# Patient Record
Sex: FEMALE | Race: WHITE | NOT HISPANIC OR LATINO
[De-identification: names, ages, dates, MRNs, and addresses within clinical notes are randomized per-mention and may not be internally consistent; named-entity substitution may affect disease eponyms.]

---

## 2017-02-26 ENCOUNTER — RESULT REVIEW (OUTPATIENT)
Age: 52
End: 2017-02-26

## 2017-02-27 ENCOUNTER — APPOINTMENT (OUTPATIENT)
Dept: OBGYN | Facility: CLINIC | Age: 52
End: 2017-02-27

## 2018-03-01 ENCOUNTER — RESULT REVIEW (OUTPATIENT)
Age: 53
End: 2018-03-01

## 2018-03-02 ENCOUNTER — APPOINTMENT (OUTPATIENT)
Dept: OBGYN | Facility: CLINIC | Age: 53
End: 2018-03-02
Payer: COMMERCIAL

## 2018-03-02 PROCEDURE — 99396 PREV VISIT EST AGE 40-64: CPT

## 2018-03-02 PROCEDURE — 82272 OCCULT BLD FECES 1-3 TESTS: CPT

## 2018-07-10 ENCOUNTER — APPOINTMENT (OUTPATIENT)
Dept: ORTHOPEDIC SURGERY | Facility: CLINIC | Age: 53
End: 2018-07-10
Payer: COMMERCIAL

## 2018-07-10 VITALS — SYSTOLIC BLOOD PRESSURE: 104 MMHG | HEART RATE: 85 BPM | DIASTOLIC BLOOD PRESSURE: 71 MMHG

## 2018-07-10 VITALS — WEIGHT: 132 LBS | HEIGHT: 63 IN | BODY MASS INDEX: 23.39 KG/M2

## 2018-07-10 DIAGNOSIS — M19.039 PRIMARY OSTEOARTHRITIS, UNSPECIFIED WRIST: ICD-10-CM

## 2018-07-10 DIAGNOSIS — Z86.79 PERSONAL HISTORY OF OTHER DISEASES OF THE CIRCULATORY SYSTEM: ICD-10-CM

## 2018-07-10 DIAGNOSIS — M17.10 UNILATERAL PRIMARY OSTEOARTHRITIS, UNSPECIFIED KNEE: ICD-10-CM

## 2018-07-10 DIAGNOSIS — Z78.9 OTHER SPECIFIED HEALTH STATUS: ICD-10-CM

## 2018-07-10 DIAGNOSIS — Z82.62 FAMILY HISTORY OF OSTEOPOROSIS: ICD-10-CM

## 2018-07-10 DIAGNOSIS — M18.12 UNILATERAL PRIMARY OSTEOARTHRITIS OF FIRST CARPOMETACARPAL JOINT, LEFT HAND: ICD-10-CM

## 2018-07-10 DIAGNOSIS — M65.311 TRIGGER THUMB, RIGHT THUMB: ICD-10-CM

## 2018-07-10 DIAGNOSIS — Z87.09 PERSONAL HISTORY OF OTHER DISEASES OF THE RESPIRATORY SYSTEM: ICD-10-CM

## 2018-07-10 PROCEDURE — 20550 NJX 1 TENDON SHEATH/LIGAMENT: CPT | Mod: RT

## 2018-07-10 PROCEDURE — 99203 OFFICE O/P NEW LOW 30 MIN: CPT | Mod: 25

## 2018-07-10 RX ORDER — POTASSIUM CHLORIDE 20 MEQ/15ML
20 MEQ/15ML SOLUTION ORAL
Qty: 1350 | Refills: 0 | Status: ACTIVE | COMMUNITY
Start: 2017-09-20

## 2018-07-10 RX ORDER — OSELTAMIVIR PHOSPHATE 75 MG/1
75 CAPSULE ORAL
Qty: 10 | Refills: 0 | Status: ACTIVE | COMMUNITY
Start: 2018-01-23

## 2018-07-10 RX ORDER — ESCITALOPRAM OXALATE 10 MG/1
10 TABLET ORAL
Qty: 90 | Refills: 0 | Status: ACTIVE | COMMUNITY
Start: 2018-03-19

## 2018-07-10 RX ORDER — CHLORTHALIDONE 25 MG/1
25 TABLET ORAL
Qty: 30 | Refills: 0 | Status: ACTIVE | COMMUNITY
Start: 2018-03-19

## 2018-07-10 RX ORDER — ALENDRONATE SODIUM 70 MG/1
70 TABLET ORAL
Qty: 12 | Refills: 0 | Status: ACTIVE | COMMUNITY
Start: 2017-11-20

## 2018-07-10 RX ORDER — OMEPRAZOLE 20 MG/1
20 CAPSULE, DELAYED RELEASE ORAL
Qty: 7 | Refills: 0 | Status: ACTIVE | COMMUNITY
Start: 2018-01-18

## 2018-07-10 RX ORDER — IRBESARTAN 300 MG/1
300 TABLET ORAL
Qty: 90 | Refills: 0 | Status: ACTIVE | COMMUNITY
Start: 2017-09-20

## 2018-07-10 RX ORDER — NAPROXEN 500 MG/1
500 TABLET ORAL
Qty: 14 | Refills: 0 | Status: ACTIVE | COMMUNITY
Start: 2018-01-18

## 2019-03-04 ENCOUNTER — APPOINTMENT (OUTPATIENT)
Dept: OBGYN | Facility: CLINIC | Age: 54
End: 2019-03-04
Payer: COMMERCIAL

## 2019-03-04 ENCOUNTER — RESULT REVIEW (OUTPATIENT)
Age: 54
End: 2019-03-04

## 2019-03-04 PROCEDURE — 99396 PREV VISIT EST AGE 40-64: CPT

## 2019-07-09 ENCOUNTER — FORM ENCOUNTER (OUTPATIENT)
Age: 54
End: 2019-07-09

## 2020-02-27 ENCOUNTER — TRANSCRIPTION ENCOUNTER (OUTPATIENT)
Age: 55
End: 2020-02-27

## 2020-03-26 ENCOUNTER — APPOINTMENT (OUTPATIENT)
Dept: OBGYN | Facility: CLINIC | Age: 55
End: 2020-03-26

## 2020-06-02 ENCOUNTER — RESULT REVIEW (OUTPATIENT)
Age: 55
End: 2020-06-02

## 2020-06-02 ENCOUNTER — APPOINTMENT (OUTPATIENT)
Dept: ULTRASOUND IMAGING | Facility: CLINIC | Age: 55
End: 2020-06-02
Payer: COMMERCIAL

## 2020-06-02 ENCOUNTER — APPOINTMENT (OUTPATIENT)
Dept: MAMMOGRAPHY | Facility: CLINIC | Age: 55
End: 2020-06-02
Payer: COMMERCIAL

## 2020-06-02 ENCOUNTER — OUTPATIENT (OUTPATIENT)
Dept: OUTPATIENT SERVICES | Facility: HOSPITAL | Age: 55
LOS: 1 days | End: 2020-06-02
Payer: COMMERCIAL

## 2020-06-02 DIAGNOSIS — Z00.8 ENCOUNTER FOR OTHER GENERAL EXAMINATION: ICD-10-CM

## 2020-06-02 PROCEDURE — 77063 BREAST TOMOSYNTHESIS BI: CPT | Mod: 26

## 2020-06-02 PROCEDURE — 77067 SCR MAMMO BI INCL CAD: CPT | Mod: 26

## 2020-06-02 PROCEDURE — 76830 TRANSVAGINAL US NON-OB: CPT | Mod: 26

## 2020-06-02 PROCEDURE — 76641 ULTRASOUND BREAST COMPLETE: CPT | Mod: 26,50

## 2020-06-02 PROCEDURE — 76856 US EXAM PELVIC COMPLETE: CPT

## 2020-06-02 PROCEDURE — 76830 TRANSVAGINAL US NON-OB: CPT

## 2020-06-02 PROCEDURE — 77063 BREAST TOMOSYNTHESIS BI: CPT

## 2020-06-02 PROCEDURE — 77067 SCR MAMMO BI INCL CAD: CPT

## 2020-06-02 PROCEDURE — 76856 US EXAM PELVIC COMPLETE: CPT | Mod: 26

## 2020-06-02 PROCEDURE — 76641 ULTRASOUND BREAST COMPLETE: CPT

## 2020-06-15 ENCOUNTER — FORM ENCOUNTER (OUTPATIENT)
Age: 55
End: 2020-06-15

## 2020-12-07 ENCOUNTER — OUTPATIENT (OUTPATIENT)
Dept: OUTPATIENT SERVICES | Facility: HOSPITAL | Age: 55
LOS: 1 days | End: 2020-12-07
Payer: COMMERCIAL

## 2020-12-07 ENCOUNTER — RESULT REVIEW (OUTPATIENT)
Age: 55
End: 2020-12-07

## 2020-12-07 ENCOUNTER — APPOINTMENT (OUTPATIENT)
Dept: ULTRASOUND IMAGING | Facility: CLINIC | Age: 55
End: 2020-12-07
Payer: COMMERCIAL

## 2020-12-07 DIAGNOSIS — Z00.00 ENCOUNTER FOR GENERAL ADULT MEDICAL EXAMINATION WITHOUT ABNORMAL FINDINGS: ICD-10-CM

## 2020-12-07 PROCEDURE — 76856 US EXAM PELVIC COMPLETE: CPT | Mod: 26

## 2020-12-07 PROCEDURE — 76830 TRANSVAGINAL US NON-OB: CPT | Mod: 26

## 2020-12-07 PROCEDURE — 76830 TRANSVAGINAL US NON-OB: CPT

## 2020-12-07 PROCEDURE — 76856 US EXAM PELVIC COMPLETE: CPT

## 2020-12-08 ENCOUNTER — FORM ENCOUNTER (OUTPATIENT)
Age: 55
End: 2020-12-08

## 2021-04-11 ENCOUNTER — FORM ENCOUNTER (OUTPATIENT)
Age: 56
End: 2021-04-11

## 2021-04-12 ENCOUNTER — APPOINTMENT (OUTPATIENT)
Dept: OBGYN | Facility: CLINIC | Age: 56
End: 2021-04-12
Payer: COMMERCIAL

## 2021-04-12 ENCOUNTER — RESULT REVIEW (OUTPATIENT)
Age: 56
End: 2021-04-12

## 2021-04-12 PROCEDURE — 99072 ADDL SUPL MATRL&STAF TM PHE: CPT

## 2021-04-12 PROCEDURE — 99396 PREV VISIT EST AGE 40-64: CPT

## 2021-04-15 ENCOUNTER — FORM ENCOUNTER (OUTPATIENT)
Age: 56
End: 2021-04-15

## 2021-04-25 ENCOUNTER — FORM ENCOUNTER (OUTPATIENT)
Age: 56
End: 2021-04-25

## 2021-06-03 ENCOUNTER — RESULT REVIEW (OUTPATIENT)
Age: 56
End: 2021-06-03

## 2021-06-03 ENCOUNTER — APPOINTMENT (OUTPATIENT)
Dept: MAMMOGRAPHY | Facility: CLINIC | Age: 56
End: 2021-06-03
Payer: COMMERCIAL

## 2021-06-03 ENCOUNTER — APPOINTMENT (OUTPATIENT)
Dept: ULTRASOUND IMAGING | Facility: CLINIC | Age: 56
End: 2021-06-03
Payer: COMMERCIAL

## 2021-06-03 ENCOUNTER — OUTPATIENT (OUTPATIENT)
Dept: OUTPATIENT SERVICES | Facility: HOSPITAL | Age: 56
LOS: 1 days | End: 2021-06-03
Payer: COMMERCIAL

## 2021-06-03 DIAGNOSIS — Z00.8 ENCOUNTER FOR OTHER GENERAL EXAMINATION: ICD-10-CM

## 2021-06-03 PROCEDURE — 76641 ULTRASOUND BREAST COMPLETE: CPT | Mod: 26,50

## 2021-06-03 PROCEDURE — 77067 SCR MAMMO BI INCL CAD: CPT

## 2021-06-03 PROCEDURE — 76856 US EXAM PELVIC COMPLETE: CPT | Mod: 26

## 2021-06-03 PROCEDURE — 76830 TRANSVAGINAL US NON-OB: CPT

## 2021-06-03 PROCEDURE — 76830 TRANSVAGINAL US NON-OB: CPT | Mod: 26

## 2021-06-03 PROCEDURE — 77063 BREAST TOMOSYNTHESIS BI: CPT

## 2021-06-03 PROCEDURE — 77063 BREAST TOMOSYNTHESIS BI: CPT | Mod: 26

## 2021-06-03 PROCEDURE — 77067 SCR MAMMO BI INCL CAD: CPT | Mod: 26

## 2021-06-03 PROCEDURE — 76856 US EXAM PELVIC COMPLETE: CPT

## 2021-06-03 PROCEDURE — 76641 ULTRASOUND BREAST COMPLETE: CPT

## 2021-06-06 ENCOUNTER — FORM ENCOUNTER (OUTPATIENT)
Age: 56
End: 2021-06-06

## 2021-06-10 ENCOUNTER — FORM ENCOUNTER (OUTPATIENT)
Age: 56
End: 2021-06-10

## 2021-09-23 ENCOUNTER — LABORATORY RESULT (OUTPATIENT)
Age: 56
End: 2021-09-23

## 2021-09-23 ENCOUNTER — APPOINTMENT (OUTPATIENT)
Dept: DERMATOLOGY | Facility: CLINIC | Age: 56
End: 2021-09-23
Payer: COMMERCIAL

## 2021-09-23 PROCEDURE — 11102 TANGNTL BX SKIN SINGLE LES: CPT | Mod: 59

## 2021-09-23 PROCEDURE — 17110 DESTRUCTION B9 LES UP TO 14: CPT

## 2021-09-23 PROCEDURE — 99203 OFFICE O/P NEW LOW 30 MIN: CPT | Mod: 25

## 2021-10-01 ENCOUNTER — NON-APPOINTMENT (OUTPATIENT)
Age: 56
End: 2021-10-01

## 2021-10-18 ENCOUNTER — APPOINTMENT (OUTPATIENT)
Dept: DERMATOLOGY | Facility: CLINIC | Age: 56
End: 2021-10-18
Payer: COMMERCIAL

## 2021-10-18 ENCOUNTER — APPOINTMENT (OUTPATIENT)
Dept: DERMATOLOGY | Facility: CLINIC | Age: 56
End: 2021-10-18

## 2021-10-18 DIAGNOSIS — L50.3 DERMATOGRAPHIC URTICARIA: ICD-10-CM

## 2021-10-18 DIAGNOSIS — L85.3 XEROSIS CUTIS: ICD-10-CM

## 2021-10-18 DIAGNOSIS — L98.8 OTHER SPECIFIED DISORDERS OF THE SKIN AND SUBCUTANEOUS TISSUE: ICD-10-CM

## 2021-10-18 DIAGNOSIS — D22.9 MELANOCYTIC NEVI, UNSPECIFIED: ICD-10-CM

## 2021-10-18 PROCEDURE — 99213 OFFICE O/P EST LOW 20 MIN: CPT

## 2022-05-10 ENCOUNTER — APPOINTMENT (OUTPATIENT)
Dept: ORTHOPEDIC SURGERY | Facility: CLINIC | Age: 57
End: 2022-05-10
Payer: COMMERCIAL

## 2022-05-10 VITALS — WEIGHT: 132 LBS | BODY MASS INDEX: 23.39 KG/M2 | HEIGHT: 63 IN

## 2022-05-10 DIAGNOSIS — M17.11 UNILATERAL PRIMARY OSTEOARTHRITIS, RIGHT KNEE: ICD-10-CM

## 2022-05-10 DIAGNOSIS — S80.01XA CONTUSION OF RIGHT KNEE, INITIAL ENCOUNTER: ICD-10-CM

## 2022-05-10 PROCEDURE — 73564 X-RAY EXAM KNEE 4 OR MORE: CPT | Mod: RT

## 2022-05-10 PROCEDURE — 99204 OFFICE O/P NEW MOD 45 MIN: CPT

## 2022-05-10 PROCEDURE — 99203 OFFICE O/P NEW LOW 30 MIN: CPT

## 2022-05-10 NOTE — HISTORY OF PRESENT ILLNESS
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 fell directly on knee.  pain located anterior aspect of right knee with associated swelling, abrasion at patella.  worse with standing and flexion of right knee, better at rest.  has tried icing and Aleve with some improvement.\par

## 2022-05-10 NOTE — IMAGING

## 2022-05-10 NOTE — ASSESSMENT
[FreeTextEntry1] : Right  X-Ray Examination of the KNEE (4 views): medial and patellofemoral degenerate changes.bone spure superio patella\par \par right knee contusion and oa exab\par \par - We discussed their diagnosis and treatment options at length including surgical and non-surgical options.\par - We will continue conservative treatment with activity modification, PT, icing, weight loss, and anti-inflammatory medications.\par - The patient was provided with a PT prescription to work on ROM, hip ER/abductors strengthening, quad/hamstring stretches and strengthening, and other exercises.\par - The patient was advised to let pain guide the gradual advancement of activities.\par - We discussed the possible of injections (steroid and viscosupplementation) in the future.\par - Follow up as needed in 6 weeks as to re-evaluate.\par \par \par Medication Discussion:\par 1) We discussed a comprehensive treatment plan that included possible pharmaceutical management involving the use of prescription strength medications including but not limited to options such as oral Naprosyn 500mg BID, once daily Meloxicam 15 mg, or 500-650 mg Tylenol versus over the counter oral medications in addition to discussing possible topical prescription Pennsaid vs  Voltaren gel.\par 2) There is a moderate risk of morbidity with further treatment, especially from use of prescription strength medications and possible side effects of these medications which include but are not limited to upset stomach with oral medications, skin reactions to topical medications and GI/cardiac/renal issues with long term use.\par 3) I recommended that the patient follow-up with their medical physician to discuss any significant specific potential issues with long term medication use such as interactions with current medications or with exacerbation of underlying medical comorbidities.\par 4) The benefits and risks associated with use of oral and / or topical prescription and over the counter anti-inflammatory medications were discussed with the patient. The patient voiced understanding of the risks including but not limited to bleeding, stroke, kidney dysfunction, heart disease, and were referred to the black box warning label for further information.\par \par

## 2022-05-11 DIAGNOSIS — Z87.19 PERSONAL HISTORY OF OTHER DISEASES OF THE DIGESTIVE SYSTEM: ICD-10-CM

## 2022-05-11 DIAGNOSIS — Z87.39 PERSONAL HISTORY OF OTHER DISEASES OF THE MUSCULOSKELETAL SYSTEM AND CONNECTIVE TISSUE: ICD-10-CM

## 2022-05-11 DIAGNOSIS — E78.00 PURE HYPERCHOLESTEROLEMIA, UNSPECIFIED: ICD-10-CM

## 2022-05-11 DIAGNOSIS — Z87.42 PERSONAL HISTORY OF OTHER DISEASES OF THE FEMALE GENITAL TRACT: ICD-10-CM

## 2022-05-11 DIAGNOSIS — R37 SEXUAL DYSFUNCTION, UNSPECIFIED: ICD-10-CM

## 2022-05-11 DIAGNOSIS — Z78.0 ASYMPTOMATIC MENOPAUSAL STATE: ICD-10-CM

## 2022-05-11 RX ORDER — ATORVASTATIN CALCIUM 80 MG/1
TABLET, FILM COATED ORAL
Refills: 0 | Status: ACTIVE | COMMUNITY

## 2022-05-13 ENCOUNTER — NON-APPOINTMENT (OUTPATIENT)
Age: 57
End: 2022-05-13

## 2022-05-13 DIAGNOSIS — Z92.89 PERSONAL HISTORY OF OTHER MEDICAL TREATMENT: ICD-10-CM

## 2022-05-13 DIAGNOSIS — Z98.890 OTHER SPECIFIED POSTPROCEDURAL STATES: ICD-10-CM

## 2022-05-13 DIAGNOSIS — Z87.19 PERSONAL HISTORY OF OTHER DISEASES OF THE DIGESTIVE SYSTEM: ICD-10-CM

## 2022-05-13 DIAGNOSIS — Z78.9 OTHER SPECIFIED HEALTH STATUS: ICD-10-CM

## 2022-06-03 ENCOUNTER — APPOINTMENT (OUTPATIENT)
Dept: OBGYN | Facility: CLINIC | Age: 57
End: 2022-06-03
Payer: COMMERCIAL

## 2022-06-03 VITALS
SYSTOLIC BLOOD PRESSURE: 117 MMHG | WEIGHT: 132 LBS | DIASTOLIC BLOOD PRESSURE: 79 MMHG | BODY MASS INDEX: 23.39 KG/M2 | HEIGHT: 63 IN

## 2022-06-03 DIAGNOSIS — N95.2 POSTMENOPAUSAL ATROPHIC VAGINITIS: ICD-10-CM

## 2022-06-03 DIAGNOSIS — Z12.4 ENCOUNTER FOR SCREENING FOR MALIGNANT NEOPLASM OF CERVIX: ICD-10-CM

## 2022-06-03 DIAGNOSIS — N95.1 MENOPAUSAL AND FEMALE CLIMACTERIC STATES: ICD-10-CM

## 2022-06-03 DIAGNOSIS — Z12.39 ENCOUNTER FOR OTHER SCREENING FOR MALIGNANT NEOPLASM OF BREAST: ICD-10-CM

## 2022-06-03 DIAGNOSIS — Z90.721 ACQUIRED ABSENCE OF OVARIES, UNILATERAL: ICD-10-CM

## 2022-06-03 DIAGNOSIS — R92.2 INCONCLUSIVE MAMMOGRAM: ICD-10-CM

## 2022-06-03 PROCEDURE — 99212 OFFICE O/P EST SF 10 MIN: CPT | Mod: 25

## 2022-06-03 PROCEDURE — 99396 PREV VISIT EST AGE 40-64: CPT

## 2022-06-03 RX ORDER — HYDROCHLOROTHIAZIDE 25 MG/1
25 TABLET ORAL
Qty: 90 | Refills: 0 | Status: DISCONTINUED | COMMUNITY
Start: 2017-11-20 | End: 2022-06-03

## 2022-06-03 RX ORDER — DOXAZOSIN 1 MG/1
1 TABLET ORAL
Qty: 90 | Refills: 0 | Status: DISCONTINUED | COMMUNITY
Start: 2017-09-20 | End: 2022-06-03

## 2022-06-03 NOTE — COUNSELING
[Nutrition/ Exercise/ Weight Management] : nutrition, exercise, weight management [Vitamins/Supplements] : vitamins/supplements [Breast Self Exam] : breast self exam [STD (testing, results, tx)] : STD (testing, results, tx) [Vaccines] : vaccines [Lab Results] : lab results [Confidentiality] : confidentiality [Medication Management] : medication management [Other ___] : [unfilled]

## 2022-06-03 NOTE — END OF VISIT
[FreeTextEntry3] : I, Lillie Neumann, acted as a scribe on behalf for Brunilda Fields NP on 06/03/2022.\par \par All medical entries made by the scribe were at my, Brunilda Fields, direction and personally dictated by me on 06/03/2022. I have reviewed the chart and agree that the record accurately reflects my personal performance of the history, physical exam, assessment, and plan. I have personally directed, reviewed, and agreed with the chart.

## 2022-06-03 NOTE — PHYSICAL EXAM
[Appropriately responsive] : appropriately responsive [Alert] : alert [No Acute Distress] : no acute distress [No Lymphadenopathy] : no lymphadenopathy [Soft] : soft [Non-tender] : non-tender [Non-distended] : non-distended [No HSM] : No HSM [No Lesions] : no lesions [No Mass] : no mass [Oriented x3] : oriented x3 [Examination Of The Breasts] : a normal appearance [No Masses] : no breast masses were palpable [Labia Majora] : normal [Labia Minora] : normal [Normal] : normal [Uterine Adnexae] : normal [Chaperone Declined] : Patient declined chaperone [Vulvar Atrophy] : vulvar atrophy

## 2022-06-03 NOTE — HISTORY OF PRESENT ILLNESS
[Patient reported PAP Smear was normal] : Patient reported PAP Smear was normal [postmenopausal] : postmenopausal [N] : Patient does not use contraception [Y] : Positive pregnancy history [Mammogramdate] : 6/2021 [TextBox_19] : BIRADS 2 [BreastSonogramDate] : 6/2021 [TextBox_25] : BIRADS 2 [PapSmeardate] : 4/2021 [TextBox_31] : NILM/HPV NEG [BoneDensityDate] : 2020 [TextBox_37] : osteoporosis on Fosmax [[facility]] : 2 [Hot Flashes] : hot flashes [Difficulty with [place]] : difficulty with intercourse [Options Discussed] : options discussed [Herbal Options] : herbal options [Vaginal Lubrication] : vaginal lubrication [SSRI] : SSRI [TextBox_18] : taking lexapro 5mg for vasomotor symptoms; advised hyaluronic acid for vaginal dryness and discomfort [Experiencing Menopausal Sxs] : experiencing menopausal symptoms [Yes] : Patient has concerns regarding sex [Currently Active] : currently active [Men] : men [No] : No [Patient refuses STI testing] : Patient refuses STI testing [FreeTextEntry1] : vaginal dryness

## 2022-06-03 NOTE — PLAN
[FreeTextEntry1] : 56 y/o P2 post-menopausal female, annual exam\par \par health care maintenance\par -pap / HPV done today\par -vit D/exercise/BMD\par -rx breast mammo/sono given\par -reviewed monthly BSE\par -colon screening reviewed\par -f/u PCP for annual and appropriate immunizations\par -rto 1 year, earlier if needed\par \par Vaginal Dryness / vasomotor symptoms\par -continue Lexapro 5 mg daily\par -hyaluronic acid vaginal suppository 2-3 times per week \par -coconut oil for lubrication during intercourse\par \par Osteoporosis \par -recent DEXA scan, managed by endocrinologist\par -pt taking Fosamax, considering Prolia cardio clearance\par \par Hx ovarian cysts / hx oophorectomy\par -rx pelvic sono\par \par RTO 1 yr or sooner as needed

## 2022-06-03 NOTE — REVIEW OF SYSTEMS
[Negative] : Heme/Lymph [Patient Intake Form Reviewed] : Patient intake form was reviewed [FreeTextEntry8] : vaginal irritation

## 2022-06-06 LAB — HPV HIGH+LOW RISK DNA PNL CVX: NOT DETECTED

## 2022-06-08 LAB — CYTOLOGY CVX/VAG DOC THIN PREP: ABNORMAL

## 2022-07-25 ENCOUNTER — APPOINTMENT (OUTPATIENT)
Dept: ULTRASOUND IMAGING | Facility: CLINIC | Age: 57
End: 2022-07-25

## 2022-07-25 ENCOUNTER — RESULT REVIEW (OUTPATIENT)
Age: 57
End: 2022-07-25

## 2022-07-25 ENCOUNTER — OUTPATIENT (OUTPATIENT)
Dept: OUTPATIENT SERVICES | Facility: HOSPITAL | Age: 57
LOS: 1 days | End: 2022-07-25
Payer: COMMERCIAL

## 2022-07-25 ENCOUNTER — APPOINTMENT (OUTPATIENT)
Dept: MAMMOGRAPHY | Facility: CLINIC | Age: 57
End: 2022-07-25

## 2022-07-25 DIAGNOSIS — Z12.39 ENCOUNTER FOR OTHER SCREENING FOR MALIGNANT NEOPLASM OF BREAST: ICD-10-CM

## 2022-07-25 DIAGNOSIS — R92.2 INCONCLUSIVE MAMMOGRAM: ICD-10-CM

## 2022-07-25 DIAGNOSIS — Z90.721 ACQUIRED ABSENCE OF OVARIES, UNILATERAL: ICD-10-CM

## 2022-07-25 DIAGNOSIS — Z00.8 ENCOUNTER FOR OTHER GENERAL EXAMINATION: ICD-10-CM

## 2022-07-25 PROCEDURE — 76830 TRANSVAGINAL US NON-OB: CPT | Mod: 26

## 2022-07-25 PROCEDURE — 76641 ULTRASOUND BREAST COMPLETE: CPT | Mod: 26,50

## 2022-07-25 PROCEDURE — 77067 SCR MAMMO BI INCL CAD: CPT | Mod: 26

## 2022-07-25 PROCEDURE — 77063 BREAST TOMOSYNTHESIS BI: CPT | Mod: 26

## 2022-07-25 PROCEDURE — 76856 US EXAM PELVIC COMPLETE: CPT

## 2022-07-25 PROCEDURE — 76856 US EXAM PELVIC COMPLETE: CPT | Mod: 26

## 2022-07-25 PROCEDURE — 77067 SCR MAMMO BI INCL CAD: CPT

## 2022-07-25 PROCEDURE — 77063 BREAST TOMOSYNTHESIS BI: CPT

## 2022-07-25 PROCEDURE — 76830 TRANSVAGINAL US NON-OB: CPT

## 2022-07-25 PROCEDURE — 76641 ULTRASOUND BREAST COMPLETE: CPT

## 2022-10-10 ENCOUNTER — APPOINTMENT (OUTPATIENT)
Dept: ORTHOPEDIC SURGERY | Facility: CLINIC | Age: 57
End: 2022-10-10

## 2022-10-10 VITALS — WEIGHT: 132 LBS | HEIGHT: 63 IN | BODY MASS INDEX: 23.39 KG/M2

## 2022-10-10 DIAGNOSIS — S16.1XXA STRAIN OF MUSCLE, FASCIA AND TENDON AT NECK LEVEL, INITIAL ENCOUNTER: ICD-10-CM

## 2022-10-10 DIAGNOSIS — M54.12 RADICULOPATHY, CERVICAL REGION: ICD-10-CM

## 2022-10-10 PROCEDURE — 99203 OFFICE O/P NEW LOW 30 MIN: CPT

## 2022-10-10 PROCEDURE — 72050 X-RAY EXAM NECK SPINE 4/5VWS: CPT

## 2022-10-10 PROCEDURE — 72110 X-RAY EXAM L-2 SPINE 4/>VWS: CPT

## 2022-10-10 RX ORDER — NAPROXEN 500 MG/1
500 TABLET ORAL
Qty: 60 | Refills: 1 | Status: ACTIVE | COMMUNITY
Start: 2022-10-10 | End: 1900-01-01

## 2022-10-10 RX ORDER — CYCLOBENZAPRINE HYDROCHLORIDE 5 MG/1
5 TABLET, FILM COATED ORAL 3 TIMES DAILY
Qty: 60 | Refills: 0 | Status: ACTIVE | COMMUNITY
Start: 2022-10-10 | End: 1900-01-01

## 2022-10-10 NOTE — IMAGING
[Disc space narrowing] : Disc space narrowing [No bony abnormalities] : No bony abnormalities [FreeTextEntry1] : 4/5

## 2022-10-10 NOTE — ASSESSMENT
[FreeTextEntry1] : Due to worsening RUE symptoms will get mri cervical spine for further evaluation of hnp/Stenosis. \par Discussed further treatment options including MDP pending mri results. \par In the interim would recommend a course of physical therapy for her neck and back. \par Also recommend naprosyn and flexeril as needed. \par She will follow up after the mri.

## 2022-10-10 NOTE — HISTORY OF PRESENT ILLNESS
[Lower back] : lower back [Sudden] : sudden [8] : 8 [3] : 3 [Dull/Aching] : dull/aching [Localized] : localized [Constant] : constant [Meds] : meds [Heat] : heat [Standing] : standing [de-identified] : 58 y/o F here for further evaluation of lower back pain. She c/o intermittent left leg burning for the past few weeks whichg did resolve. Then recently 4 days ago she stepped out of the car the wrong way and started having sharp pain to her lower back. No Bowel or  bladder issues. She has been taking aleve that gives her partial relief. She denies any previous lower back issues. She also c/o right thumb tingling getting progressively worse over the past 6 months.  She also states she has chronic neck pain that has gotten better with physical therapy in the past. \par \par Occupation: Speech Pathologist\par Allergies: PCN, sulfur, erythromycin. \par  [] : no [FreeTextEntry3] : 1 WEEK  [FreeTextEntry5] : PT STATED SHE WAS WORKING OUT WHEN SHE FELT A POP IN HER LOWE BACK HAS HAD CONSTANT BACK PAIN SINCE

## 2022-12-10 ENCOUNTER — APPOINTMENT (OUTPATIENT)
Dept: ORTHOPEDIC SURGERY | Facility: CLINIC | Age: 57
End: 2022-12-10
Payer: COMMERCIAL

## 2022-12-10 VITALS — WEIGHT: 132 LBS | HEIGHT: 63 IN | BODY MASS INDEX: 23.39 KG/M2

## 2022-12-10 DIAGNOSIS — S29.012A STRAIN OF MUSCLE AND TENDON OF BACK WALL OF THORAX, INITIAL ENCOUNTER: ICD-10-CM

## 2022-12-10 PROCEDURE — 99204 OFFICE O/P NEW MOD 45 MIN: CPT

## 2022-12-10 PROCEDURE — 99213 OFFICE O/P EST LOW 20 MIN: CPT

## 2022-12-10 RX ORDER — GABAPENTIN 100 MG/1
100 CAPSULE ORAL 3 TIMES DAILY
Qty: 30 | Refills: 0 | Status: ACTIVE | COMMUNITY
Start: 2022-12-10 | End: 1900-01-01

## 2022-12-10 NOTE — HISTORY OF PRESENT ILLNESS
[9] : 9 [3] : 3 [Dull/Aching] : dull/aching [Sharp] : sharp [Shooting] : shooting [Constant] : constant [Rest] : rest [de-identified] : 54 y/o F with atraumatic thoracic back pain x 1-2 weeks. Occasional tingling BUE. Denies numbness. denies bladder or bowel issues. She went to [facility] and was given MDP and muscle relaxers without relief.\par denies DM.\par  [] : no

## 2022-12-10 NOTE — ASSESSMENT
[FreeTextEntry1] : A/P Thoracic back pain with radicular symptoms\par - cont medrol dose rufus\par - muscle relaxant\par - MRI\par - f/u spine\par

## 2022-12-10 NOTE — IMAGING
[de-identified] : PE thoracic spine: +tenderness to R paraspinals, mild midline tenderness, limited motion due to pain, able to SLR with pain, motor and sensory intact, NVI\par  [No bony abnormalities] : No bony abnormalities

## 2022-12-13 ENCOUNTER — APPOINTMENT (OUTPATIENT)
Dept: MRI IMAGING | Facility: CLINIC | Age: 57
End: 2022-12-13

## 2022-12-21 ENCOUNTER — APPOINTMENT (OUTPATIENT)
Dept: PAIN MANAGEMENT | Facility: CLINIC | Age: 57
End: 2022-12-21
Payer: COMMERCIAL

## 2022-12-21 PROCEDURE — 99204 OFFICE O/P NEW MOD 45 MIN: CPT

## 2022-12-21 PROCEDURE — 99203 OFFICE O/P NEW LOW 30 MIN: CPT

## 2022-12-21 RX ORDER — TRAMADOL HYDROCHLORIDE 50 MG/1
50 TABLET, COATED ORAL 3 TIMES DAILY
Qty: 21 | Refills: 0 | Status: ACTIVE | COMMUNITY
Start: 2022-12-21 | End: 1900-01-01

## 2022-12-21 RX ORDER — GABAPENTIN 300 MG/1
300 CAPSULE ORAL
Qty: 90 | Refills: 0 | Status: ACTIVE | COMMUNITY
Start: 2022-12-21 | End: 1900-01-01

## 2022-12-21 NOTE — HISTORY OF PRESENT ILLNESS
[Upper back] : upper back [Mid-back] : mid-back [10] : 10 [9] : 9 [Constant] : constant [Sleep] : sleep [Rest] : rest [Meds] : meds [Heat] : heat [Sitting] : sitting [Standing] : standing [Walking] : walking [Bending forward] : bending forward [Stairs] : stairs [FreeTextEntry1] : 12/21/22- Pain is in thoracic area for last 2 weeks.  She had seen Dr. Feliz for low back pain and got better.  Then had MDP and did not help.  Went to urgent care and then had thoracic MRI.  T11-12 central disc extrusion mildly effaces ventral thecal sac without significant spinal canal stenosis.  Has muscle spasms.  Pain radiates around to ribs on left side.  She is taking Naprosyn which helped.  \par  [] : no [FreeTextEntry6] : spam, numbness on the foot  [FreeTextEntry7] : left thigh and right foot  [de-identified] : MRI thoracic spine 12/14/22

## 2022-12-21 NOTE — ASSESSMENT
[FreeTextEntry1] : After discussing various treatment options with the patient including but not limited to oral medications, physical therapy, exercise, modalities as well as interventional spinal injections, we have decided with the following plan:\par \par 1) Intervention Injection Therapy:\par I personally reviewed the MRI/CT scan images and agree with the radiologist's report. The radiological findings were discussed with the patient.\par The risks, benefits, contents and alternatives to injection were explained in full to the patient. Risks outlined include but are not limited to infection,sepsis, bleeding, post-dural puncture headache, nerve damage, temporary increase in pain, syncopal episode, failure to resolve symptoms, allergic reaction, symptom recurrence, and elevation of blood sugar in diabetics. Cortisone may cause immunosuppression. Patient understands the risks. All questions were answered. After discussion of options, patient requested an injection. Information regarding the injection was given to the patient. Which medications to stop prior to the injection was explained to the patient as well.\par \par Follow up in 1-2 weeks post injection for re-evaluation. \par Continue Home exercises, stretching, activity modification, physical therapy, and conservative care.\par Patient is presenting with acute/sub-acute radicular pain with impairment in ADLs and functionality.  The pain has not responded to  conservative care including nsaid therapy and/or physical therapy.  There is no bleeding tendency, unstable medical condition, or systemic infection.\par \par THESI T11/12

## 2022-12-22 ENCOUNTER — APPOINTMENT (OUTPATIENT)
Dept: PAIN MANAGEMENT | Facility: CLINIC | Age: 57
End: 2022-12-22

## 2022-12-22 ENCOUNTER — APPOINTMENT (OUTPATIENT)
Dept: ORTHOPEDIC SURGERY | Facility: CLINIC | Age: 57
End: 2022-12-22

## 2022-12-28 LAB — SARS-COV-2 N GENE NPH QL NAA+PROBE: NOT DETECTED

## 2022-12-29 ENCOUNTER — APPOINTMENT (OUTPATIENT)
Age: 57
End: 2022-12-29
Payer: COMMERCIAL

## 2022-12-29 PROCEDURE — 62321 NJX INTERLAMINAR CRV/THRC: CPT

## 2023-01-11 ENCOUNTER — APPOINTMENT (OUTPATIENT)
Dept: PAIN MANAGEMENT | Facility: CLINIC | Age: 58
End: 2023-01-11
Payer: COMMERCIAL

## 2023-01-11 VITALS — BODY MASS INDEX: 23.39 KG/M2 | WEIGHT: 132 LBS | HEIGHT: 63 IN

## 2023-01-11 PROCEDURE — 99214 OFFICE O/P EST MOD 30 MIN: CPT

## 2023-01-11 RX ORDER — GABAPENTIN 300 MG/1
300 CAPSULE ORAL
Qty: 120 | Refills: 2 | Status: ACTIVE | COMMUNITY
Start: 2023-01-11 | End: 1900-01-01

## 2023-01-11 NOTE — ASSESSMENT
[FreeTextEntry1] : After discussing various treatment options with the patient including but not limited to oral medications, physical therapy, exercise, modalities as well as interventional spinal injections, we have decided with the following plan:\par \par 1) The patient would benefit from Trial \par of physical therapy. Short and Long Term goals would be improvement of pain level, improvement of range of motion, improvement of strength and overall improvement of quality of life. \par \par 2) A MRI is indicated as there has been failure of numerous conservative therapies over the last 6-8 weeks. these include but are not limited to medication therapy and physical therapy. \par \par 3) increase gabapentin. currently on 300mg TID

## 2023-01-11 NOTE — REASON FOR VISIT
[Initial Consultation] : an initial pain management consultation [FreeTextEntry2] : f/u to injection

## 2023-01-11 NOTE — HISTORY OF PRESENT ILLNESS
[Upper back] : upper back [Mid-back] : mid-back [Constant] : constant [Rest] : rest [Meds] : meds [Heat] : heat [Sitting] : sitting [Standing] : standing [Walking] : walking [Bending forward] : bending forward [Stairs] : stairs [2] : 2 [1] : 2 [Radiating] : radiating [Injection therapy] : injection therapy [] : no [FreeTextEntry6] : numbness on the foot  [FreeTextEntry7] : left thigh and right foot , left leg on the hip area  [de-identified] : MRI thoracic spine 12/14/22 [FreeTextEntry1] : 1/11/23- fu for THESI T11-12 on 12/29/22. Pt with about 20% relief of the upper back, however still with stiffness. Pain also in the left buttock, \par \par 12/21/22- Pain is in thoracic area for last 2 weeks.  She had seen Dr. Feliz for low back pain and got better.  Then had MDP and did not help.  Went to urgent care and then had thoracic MRI.  T11-12 central disc extrusion mildly effaces ventral thecal sac without significant spinal canal stenosis.  Has muscle spasms.  Pain radiates around to ribs on left side.  She is taking Naprosyn which helped.  \par

## 2023-01-16 ENCOUNTER — APPOINTMENT (OUTPATIENT)
Dept: MRI IMAGING | Facility: CLINIC | Age: 58
End: 2023-01-16

## 2023-01-25 ENCOUNTER — APPOINTMENT (OUTPATIENT)
Dept: ULTRASOUND IMAGING | Facility: CLINIC | Age: 58
End: 2023-01-25
Payer: COMMERCIAL

## 2023-01-25 PROCEDURE — 76700 US EXAM ABDOM COMPLETE: CPT

## 2023-01-30 ENCOUNTER — FORM ENCOUNTER (OUTPATIENT)
Age: 58
End: 2023-01-30

## 2023-01-31 ENCOUNTER — APPOINTMENT (OUTPATIENT)
Dept: MRI IMAGING | Facility: CLINIC | Age: 58
End: 2023-01-31
Payer: COMMERCIAL

## 2023-01-31 PROCEDURE — 72148 MRI LUMBAR SPINE W/O DYE: CPT

## 2023-02-06 ENCOUNTER — APPOINTMENT (OUTPATIENT)
Dept: PAIN MANAGEMENT | Facility: CLINIC | Age: 58
End: 2023-02-06
Payer: COMMERCIAL

## 2023-02-06 VITALS — WEIGHT: 136 LBS | HEIGHT: 63 IN | BODY MASS INDEX: 24.1 KG/M2

## 2023-02-06 DIAGNOSIS — M50.90 CERVICAL DISC DISORDER, UNSPECIFIED, UNSPECIFIED CERVICAL REGION: ICD-10-CM

## 2023-02-06 DIAGNOSIS — M53.3 SACROCOCCYGEAL DISORDERS, NOT ELSEWHERE CLASSIFIED: ICD-10-CM

## 2023-02-06 DIAGNOSIS — M51.24 OTHER INTERVERTEBRAL DISC DISPLACEMENT, THORACIC REGION: ICD-10-CM

## 2023-02-06 PROCEDURE — 99213 OFFICE O/P EST LOW 20 MIN: CPT

## 2023-02-06 PROCEDURE — 99214 OFFICE O/P EST MOD 30 MIN: CPT

## 2023-02-06 RX ORDER — GABAPENTIN 300 MG/1
300 CAPSULE ORAL 3 TIMES DAILY
Qty: 270 | Refills: 0 | Status: ACTIVE | COMMUNITY
Start: 2023-02-06 | End: 1900-01-01

## 2023-02-06 NOTE — HISTORY OF PRESENT ILLNESS
[Upper back] : upper back [Mid-back] : mid-back [2] : 2 [1] : 2 [Radiating] : radiating [Constant] : constant [Rest] : rest [Meds] : meds [Heat] : heat [Injection therapy] : injection therapy [Sitting] : sitting [Standing] : standing [Walking] : walking [Bending forward] : bending forward [Stairs] : stairs [] : no [FreeTextEntry6] : numbness on the foot  [FreeTextEntry7] : left thigh and right foot , left leg on the hip area  [de-identified] : MRI thoracic spine 12/14/22 [FreeTextEntry1] : 2/6/23- follow up to review MRI lumbar spine. \par MRI lumbar spine: (1/31/23)1. T12-L1: Broad bulge with central herniation and cranial migration posterior to T12 impressing on the thecal \par sac.\par 2. L1-L2: Facet hypertrophy and ligamentum flavum hypertrophy.\par 3. L2-L3: Facet hypertrophy and ligamentum flavum hypertrophy.\par 4. L3-L4: Facet hypertrophy and ligamentum flavum hypertrophy.\par 5. L4-L5: Facet hypertrophy and ligamentum flavum hypertrophy. 6. L5-S1: Facet hypertrophy and ligamentum flavum hypertrophy.\par 7. 10 mm nonspecific lesion at second sacral vertebral body. MRI of the sacrum with and without contrast\par suggested for better detailed evaluation. No fracture.\par \par Pain in the left thoracic spine that radiates around. \par \par 1/11/23- fu for THESI T11-12 on 12/29/22. Pt with about 20% relief of the upper back, however still with stiffness. Pain also in the left buttock, \par \par 12/21/22- Pain is in thoracic area for last 2 weeks.  She had seen Dr. Feliz for low back pain and got better.  Then had MDP and did not help.  Went to urgent care and then had thoracic MRI.  T11-12 central disc extrusion mildly effaces ventral thecal sac without significant spinal canal stenosis.  Has muscle spasms.  Pain radiates around to ribs on left side.  She is taking Naprosyn which helped.  \par

## 2023-02-06 NOTE — PHYSICAL EXAM
[4___] : right hip flexion 4[unfilled]/5 [] : motor exam is not 5/5 throughout both lower extremities with normal tone

## 2023-02-06 NOTE — ASSESSMENT
[FreeTextEntry1] : After discussing various treatment options with the patient including but not limited to oral medications, physical therapy, exercise, modalities as well as interventional spinal injections, we have decided with the following plan:\par \par 1) MRI sacrum with and without contrast to further evaluate findings seen on MRI lumbar spine. \par \par 2) The patient would benefit from continuation of physical therapy. Short and Long Term goals would be improvement of pain level, improvement of range of motion, improvement of strength and overall improvement of quality of life.\par \par Patient instructed to continue both active and passive therapy, at home as an extension of the treatment process in order to maintain improvement. \par \par Goals: improve cardiovascular fitness, reduce edema, improve muscle strength, improve connective tissue strength and integrity, increase bone density, promote circulation to enhance soft tissue healing, improvement of muscle recruitment, increased ROM and promotion of normal movement. \par \par 3) continue gabapentin 300mg TID

## 2023-02-12 ENCOUNTER — FORM ENCOUNTER (OUTPATIENT)
Age: 58
End: 2023-02-12

## 2023-02-13 ENCOUNTER — APPOINTMENT (OUTPATIENT)
Dept: MRI IMAGING | Facility: CLINIC | Age: 58
End: 2023-02-13
Payer: COMMERCIAL

## 2023-02-13 PROCEDURE — 72197 MRI PELVIS W/O & W/DYE: CPT

## 2023-02-15 ENCOUNTER — APPOINTMENT (OUTPATIENT)
Dept: PAIN MANAGEMENT | Facility: CLINIC | Age: 58
End: 2023-02-15
Payer: COMMERCIAL

## 2023-02-15 VITALS — HEIGHT: 63 IN | BODY MASS INDEX: 24.1 KG/M2 | WEIGHT: 136 LBS

## 2023-02-15 DIAGNOSIS — M54.16 RADICULOPATHY, LUMBAR REGION: ICD-10-CM

## 2023-02-15 PROCEDURE — 99214 OFFICE O/P EST MOD 30 MIN: CPT

## 2023-02-15 PROCEDURE — 99213 OFFICE O/P EST LOW 20 MIN: CPT

## 2023-02-15 NOTE — HISTORY OF PRESENT ILLNESS
[Upper back] : upper back [Mid-back] : mid-back [2] : 2 [1] : 2 [Radiating] : radiating [Constant] : constant [Rest] : rest [Meds] : meds [Heat] : heat [Injection therapy] : injection therapy [Sitting] : sitting [Standing] : standing [Walking] : walking [Bending forward] : bending forward [Stairs] : stairs [FreeTextEntry1] : 02/15/23: follow up today to review MRI Sacrum 2/13/22 Impression: \par 1.5 cm nonspecific lesion in the second sacral vertebral body with no significant enhancement. No surrounding \par marrow edema, no cortical disruption, or soft tissue lesion. Differential could include atypical hemangioma \par although this is completely nonspecific. Follow up imaging in six months' time suggested for further evaluation.\par this was reviewed with the patient. Pain in the left buttock. She just started seeing a new therapist. \par \par 2/6/23- follow up to review MRI lumbar spine. \par MRI lumbar spine: (1/31/23)1. T12-L1: Broad bulge with central herniation and cranial migration posterior to T12 impressing on the thecal \par sac.\par 2. L1-L2: Facet hypertrophy and ligamentum flavum hypertrophy.\par 3. L2-L3: Facet hypertrophy and ligamentum flavum hypertrophy.\par 4. L3-L4: Facet hypertrophy and ligamentum flavum hypertrophy.\par 5. L4-L5: Facet hypertrophy and ligamentum flavum hypertrophy. \par 6. L5-S1: Facet hypertrophy and ligamentum flavum hypertrophy.\par 7. 10 mm nonspecific lesion at second sacral vertebral body. MRI of the sacrum with and without contrast\par suggested for better detailed evaluation. No fracture.\par \par Pain in the left thoracic spine that radiates around. \par \par 1/11/23- fu for THESI T11-12 on 12/29/22. Pt with about 20% relief of the upper back, however still with stiffness. Pain also in the left buttock, \par \par 12/21/22- Pain is in thoracic area for last 2 weeks.  She had seen Dr. Feliz for low back pain and got better.  Then had MDP and did not help.  Went to urgent care and then had thoracic MRI.  T11-12 central disc extrusion mildly effaces ventral thecal sac without significant spinal canal stenosis.  Has muscle spasms.  Pain radiates around to ribs on left side.  She is taking Naprosyn which helped.  \par  [] : no [FreeTextEntry6] : numbness on the foot  [de-identified] : MRI thoracic spine 12/14/22 [FreeTextEntry7] : left thigh and right foot , left leg on the hip area

## 2023-02-15 NOTE — HISTORY OF PRESENT ILLNESS
[Upper back] : upper back [Mid-back] : mid-back [2] : 2 [1] : 2 [Radiating] : radiating [Constant] : constant [Rest] : rest [Meds] : meds [Heat] : heat [Injection therapy] : injection therapy [Sitting] : sitting [Standing] : standing [Walking] : walking [Bending forward] : bending forward [Stairs] : stairs [FreeTextEntry1] : 02/15/23: follow up today to review MRI Sacrum 2/13/22 Impression: \par 1.5 cm nonspecific lesion in the second sacral vertebral body with no significant enhancement. No surrounding \par marrow edema, no cortical disruption, or soft tissue lesion. Differential could include atypical hemangioma \par although this is completely nonspecific. Follow up imaging in six months' time suggested for further evaluation.\par this was reviewed with the patient. Pain in the left buttock. She just started seeing a new therapist. \par \par 2/6/23- follow up to review MRI lumbar spine. \par MRI lumbar spine: (1/31/23)1. T12-L1: Broad bulge with central herniation and cranial migration posterior to T12 impressing on the thecal \par sac.\par 2. L1-L2: Facet hypertrophy and ligamentum flavum hypertrophy.\par 3. L2-L3: Facet hypertrophy and ligamentum flavum hypertrophy.\par 4. L3-L4: Facet hypertrophy and ligamentum flavum hypertrophy.\par 5. L4-L5: Facet hypertrophy and ligamentum flavum hypertrophy. \par 6. L5-S1: Facet hypertrophy and ligamentum flavum hypertrophy.\par 7. 10 mm nonspecific lesion at second sacral vertebral body. MRI of the sacrum with and without contrast\par suggested for better detailed evaluation. No fracture.\par \par Pain in the left thoracic spine that radiates around. \par \par 1/11/23- fu for THESI T11-12 on 12/29/22. Pt with about 20% relief of the upper back, however still with stiffness. Pain also in the left buttock, \par \par 12/21/22- Pain is in thoracic area for last 2 weeks.  She had seen Dr. Feliz for low back pain and got better.  Then had MDP and did not help.  Went to urgent care and then had thoracic MRI.  T11-12 central disc extrusion mildly effaces ventral thecal sac without significant spinal canal stenosis.  Has muscle spasms.  Pain radiates around to ribs on left side.  She is taking Naprosyn which helped.  \par  [] : no [FreeTextEntry6] : numbness on the foot  [FreeTextEntry7] : left thigh and right foot , left leg on the hip area  [de-identified] : MRI thoracic spine 12/14/22

## 2023-02-15 NOTE — ASSESSMENT
[FreeTextEntry1] : After discussing various treatment options with the patient including but not limited to oral medications, physical therapy, exercise, modalities as well as interventional spinal injections, we have decided with the following plan: \par \par 1) The patient would benefit from continuation of physical therapy. Short and Long Term goals would be improvement of pain level, improvement of range of motion, improvement of strength and overall improvement of quality of life.\par \par Patient instructed to continue both active and passive therapy, at home as an extension of the treatment process in order to maintain improvement. \par \par Goals: improve cardiovascular fitness, reduce edema, improve muscle strength, improve connective tissue strength and integrity, increase bone density, promote circulation to enhance soft tissue healing, improvement of muscle recruitment, increased ROM and promotion of normal movement. \par \par 3) will titrate gabapentin down to 300mg BID to see if as effective. \par \par 4) if no better, or getting worse, consider either LESI L4/5 or left lumbar MBB\par The risks, benefits, contents and alternatives to injection were explained in full to the patient.  Risks outlined include but are not limited to infection,sepsis, bleeding, post-dural puncture headache, nerve damage, temporary increase in pain, syncopal episode, failure to resolve symptoms, allergic reaction, symptom recurrence, and elevation of blood sugar in diabetics. Cortisone may cause immunosuppression.  Patient understands the risks.  All questions were answered.  After discussion of options, patient requested an injection.  Information regarding the injection was given to the patient.  Which medications to stop prior to the injection was explained to the patient as well

## 2023-03-08 ENCOUNTER — APPOINTMENT (OUTPATIENT)
Dept: PAIN MANAGEMENT | Facility: CLINIC | Age: 58
End: 2023-03-08
Payer: COMMERCIAL

## 2023-03-08 VITALS — WEIGHT: 138 LBS | BODY MASS INDEX: 24.45 KG/M2 | HEIGHT: 63 IN

## 2023-03-08 DIAGNOSIS — S39.012A STRAIN OF MUSCLE, FASCIA AND TENDON OF LOWER BACK, INITIAL ENCOUNTER: ICD-10-CM

## 2023-03-08 PROCEDURE — 99213 OFFICE O/P EST LOW 20 MIN: CPT

## 2023-03-08 RX ORDER — CELECOXIB 200 MG/1
200 CAPSULE ORAL
Qty: 30 | Refills: 0 | Status: ACTIVE | COMMUNITY
Start: 2023-03-08 | End: 1900-01-01

## 2023-03-08 NOTE — HISTORY OF PRESENT ILLNESS
[Upper back] : upper back [Mid-back] : mid-back [Rest] : rest [Meds] : meds [Heat] : heat [Injection therapy] : injection therapy [Sitting] : sitting [Standing] : standing [Walking] : walking [Bending forward] : bending forward [Stairs] : stairs [Dull/Aching] : dull/aching [Physical therapy] : physical therapy [Radiating] : radiating [Sharp] : sharp [FreeTextEntry1] : 02/15/23: follow up today to review MRI Sacrum 2/13/22 Impression: \par 1.5 cm nonspecific lesion in the second sacral vertebral body with no significant enhancement. No surrounding \par marrow edema, no cortical disruption, or soft tissue lesion. Differential could include atypical hemangioma \par although this is completely nonspecific. Follow up imaging in six months' time suggested for further evaluation.\par this was reviewed with the patient. Pain in the left buttock. She just started seeing a new therapist. \par \par 2/6/23- follow up to review MRI lumbar spine. \par MRI lumbar spine: (1/31/23)1. T12-L1: Broad bulge with central herniation and cranial migration posterior to T12 impressing on the thecal \par sac.\par 2. L1-L2: Facet hypertrophy and ligamentum flavum hypertrophy.\par 3. L2-L3: Facet hypertrophy and ligamentum flavum hypertrophy.\par 4. L3-L4: Facet hypertrophy and ligamentum flavum hypertrophy.\par 5. L4-L5: Facet hypertrophy and ligamentum flavum hypertrophy. \par 6. L5-S1: Facet hypertrophy and ligamentum flavum hypertrophy.\par 7. 10 mm nonspecific lesion at second sacral vertebral body. MRI of the sacrum with and without contrast\par suggested for better detailed evaluation. No fracture.\par \par Pain in the left thoracic spine that radiates around. \par \par 1/11/23- fu for THESI T11-12 on 12/29/22. Pt with about 20% relief of the upper back, however still with stiffness. Pain also in the left buttock, \par \par 12/21/22- Pain is in thoracic area for last 2 weeks.  She had seen Dr. Feliz for low back pain and got better.  Then had MDP and did not help.  Went to urgent care and then had thoracic MRI.  T11-12 central disc extrusion mildly effaces ventral thecal sac without significant spinal canal stenosis.  Has muscle spasms.  Pain radiates around to ribs on left side.  She is taking Naprosyn which helped.  \par  [2] : 2 [1] : 2 [Constant] : constant [] : no [FreeTextEntry6] : numbness on the foot  [FreeTextEntry7] : left thigh and right foot , left leg on the hip area  [de-identified] : MRI thoracic spine 12/14/22

## 2023-03-08 NOTE — ASSESSMENT
[FreeTextEntry1] : After discussing various treatment options with the patient including but not limited to oral medications, physical therapy, exercise, modalities as well as interventional spinal injections, we have decided with the following plan: \par \par 1) The patient would benefit from continuation of physical therapy. Short and Long Term goals would be improvement of pain level, improvement of range of motion, improvement of strength and overall improvement of quality of life.\par \par Patient instructed to continue both active and passive therapy, at home as an extension of the treatment process in order to maintain improvement. \par \par Goals: improve cardiovascular fitness, reduce edema, improve muscle strength, improve connective tissue strength and integrity, increase bone density, promote circulation to enhance soft tissue healing, improvement of muscle recruitment, increased ROM and promotion of normal movement. \par \par 3) will titrate gabapentin down to 300mg BID to see if as effective. \par \par LESI L4-L5 will call\par \par The risks, benefits, contents and alternatives to injection were explained in full to the patient.  Risks outlined include but are not limited to infection,sepsis, bleeding, post-dural puncture headache, nerve damage, temporary increase in pain, syncopal episode, failure to resolve symptoms, allergic reaction, symptom recurrence, and elevation of blood sugar in diabetics. Cortisone may cause immunosuppression.  Patient understands the risks.  All questions were answered.  After discussion of options, patient requested an injection.  Information regarding the injection was given to the patient.  Which medications to stop prior to the injection was explained to the patient as well

## 2023-05-22 DIAGNOSIS — N83.209 UNSPECIFIED OVARIAN CYST, UNSPECIFIED SIDE: ICD-10-CM

## 2023-06-05 ENCOUNTER — APPOINTMENT (OUTPATIENT)
Dept: OBGYN | Facility: CLINIC | Age: 58
End: 2023-06-05
Payer: COMMERCIAL

## 2023-06-05 VITALS
HEIGHT: 63 IN | BODY MASS INDEX: 23.92 KG/M2 | WEIGHT: 135 LBS | SYSTOLIC BLOOD PRESSURE: 139 MMHG | DIASTOLIC BLOOD PRESSURE: 86 MMHG

## 2023-06-05 PROCEDURE — 99396 PREV VISIT EST AGE 40-64: CPT

## 2023-06-05 NOTE — PLAN
[FreeTextEntry1] : 57 yo female pt present for an annual wellness exam\par \par HCM\par -pap done today \par -rx given for breast mammo/ sono\par -rx given for pelvic sono\par -f/u PCP for annual and appropriate immunizations\par -rto 1 year

## 2023-06-05 NOTE — HISTORY OF PRESENT ILLNESS
[Patient reported mammogram was normal] : Patient reported mammogram was normal [Patient reported breast sonogram was normal] : Patient reported breast sonogram was normal [Patient reported PAP Smear was normal] : Patient reported PAP Smear was normal [FreeTextEntry1] : 57 y/o female pt  presents for an annual wellness visit. LMP: Age 46. The pt has some questions regarding Lume due to vaginal odor. The pt is well and has no complaints. \par \par OBHx:  x2 (c/b PTL, PEC)\par PMHx: asthma, htn, ibs, osteoporosis, HCL, breast fibroadenoma \par SHx: cholecystectomy, r oophorectomy\par Allergies: sulfa, pcn, azithromycin\par Medications: Lexapro 5 mg for hot flashes; Fosamax, chlorthalidone, potassium, atorvastatin \par  [Mammogramdate] : 07/22 [TextBox_19] : Birads 2 [BreastSonogramDate] : 07/22 [PapSmeardate] : 06/22

## 2023-06-07 LAB — HPV HIGH+LOW RISK DNA PNL CVX: NOT DETECTED

## 2023-06-08 LAB — CYTOLOGY CVX/VAG DOC THIN PREP: ABNORMAL

## 2023-07-27 ENCOUNTER — NON-APPOINTMENT (OUTPATIENT)
Age: 58
End: 2023-07-27

## 2023-07-27 ENCOUNTER — APPOINTMENT (OUTPATIENT)
Dept: PHYSICAL MEDICINE AND REHAB | Facility: CLINIC | Age: 58
End: 2023-07-27
Payer: COMMERCIAL

## 2023-07-27 DIAGNOSIS — S29.019A STRAIN OF MUSCLE AND TENDON OF UNSPECIFIED WALL OF THORAX, INITIAL ENCOUNTER: ICD-10-CM

## 2023-07-27 PROCEDURE — 20553 NJX 1/MLT TRIGGER POINTS 3/>: CPT

## 2023-07-27 PROCEDURE — 99204 OFFICE O/P NEW MOD 45 MIN: CPT | Mod: 25

## 2023-07-27 RX ORDER — CELECOXIB 200 MG/1
200 CAPSULE ORAL DAILY
Qty: 30 | Refills: 1 | Status: ACTIVE | COMMUNITY
Start: 2023-07-27 | End: 1900-01-01

## 2023-07-27 NOTE — PROCEDURE
[de-identified] : Patient with myofascial pain, resistant to treatment with pain medication, therapy. Risks, benefits, expectations, and complications of trigger point injections were discussed, informed consent was obtained, and patient agreed to trigger point injections. Palpation was used to identify taught muscle bands, A 25 gauge, 1.5 inch needle was used, 2% lidocaine, x 0.6 cc, to each trigger point in left thoracic paraspinal muscles x 2, lower trapezius, patient tolerated the procedure well, can use heat/take tylenol as needed for pain.\par

## 2023-07-27 NOTE — REVIEW OF SYSTEMS
[Muscle Pain] : muscle pain [Fever] : no fever [Chills] : no chills [Incontinence] : no incontinence [Muscle Weakness] : no muscle weakness [Difficulty Walking] : no difficulty walking

## 2023-07-27 NOTE — PHYSICAL EXAM
[FreeTextEntry1] : Gen: seated, hunched over in discomfort but NAD\par Head: atraumatic, normocephalic\par Eyes: anicteric\par Resp: normal effort on room air\par Back: spine nontender; focal area of tenderness and palpable muscle tightness left of midline to lower thoracic and lumbar back; b/l SLR neg\par Ext: no cyanosis or edema\par Skin: warm and dry\par Neuro: alert; oriented to self, place, situation; strength 5/5 throughout; sensation grossly intact to light touch; reflexes 2+ and symmetric; tandem gait intact\par Psych: appropriate mood and affect

## 2023-07-27 NOTE — HISTORY OF PRESENT ILLNESS
[FreeTextEntry1] : 57yo F w/ h/o HTN, PUD, and osteoporosis on Prolia, presenting for recurrent L midback and buttock pain. Pain first occurred 12/2022. Only some relief w/ Medrol Dosepak and T11-12 FERMIN at that time. Resolved w/ prolonged course of PT, nightly Flexeril, and scheduled naproxen. After 3wks scheduled naproxen, developed stomach ulcer. Pain resolved by 02/2023. Took course of Celebrex + Protonix for recurrent pain in 03/2022. Has continued w/ home exercises.\par \par Current episode began w/ waxing/waning L buttock pain w/ radiation to posterior thigh, attributed to increased walking. Then severe L midback pain developed 2d ago. No acute trauma. Some relief by leaning over. 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 Symptoms very similar to 12/2022 episode. No new numbness, tingling, weakness, incontinence. Resumed PT 07/20; received heat and massage. Some relief with heat at home. Flexeril helping with sleep, but less so for pain relief. Took leftover naproxen this AM. No relief w/ Tylenol or lidocaine patch. On vitD + Prolia for osteoporosis. Last Prolia injection w/i last 6mo. Endorses nutritious diet. No previous trial of trigger injections.

## 2023-07-27 NOTE — ASSESSMENT
[FreeTextEntry1] : 57yo F w/ h/o PUD and osteoporosis on Prolia, presenting for recurrent acute back pain. History and exam most suggestive of muscular pain. No trauma or midline tenderness concerning for fracture, and pt on schedule for Prolia. No signs/symptoms concerning for radicular/neuropathic etiology. 2% lidocaine trigger point injections provided to 3 sites of paraspinal muscles of L mid and lower back. Rx provided for Celebrex and Protonix. Rx provided to continue PT. Continue HEP and PRN heat. F/u PRN for repeat trigger injections or worsening/persistent pain.

## 2023-07-28 ENCOUNTER — RESULT REVIEW (OUTPATIENT)
Age: 58
End: 2023-07-28

## 2023-07-28 ENCOUNTER — APPOINTMENT (OUTPATIENT)
Dept: ULTRASOUND IMAGING | Facility: CLINIC | Age: 58
End: 2023-07-28
Payer: COMMERCIAL

## 2023-07-28 ENCOUNTER — OUTPATIENT (OUTPATIENT)
Dept: OUTPATIENT SERVICES | Facility: HOSPITAL | Age: 58
LOS: 1 days | End: 2023-07-28
Payer: COMMERCIAL

## 2023-07-28 ENCOUNTER — APPOINTMENT (OUTPATIENT)
Dept: MAMMOGRAPHY | Facility: CLINIC | Age: 58
End: 2023-07-28
Payer: COMMERCIAL

## 2023-07-28 DIAGNOSIS — Z12.39 ENCOUNTER FOR OTHER SCREENING FOR MALIGNANT NEOPLASM OF BREAST: ICD-10-CM

## 2023-07-28 PROCEDURE — 77067 SCR MAMMO BI INCL CAD: CPT

## 2023-07-28 PROCEDURE — 77063 BREAST TOMOSYNTHESIS BI: CPT | Mod: 26

## 2023-07-28 PROCEDURE — 76641 ULTRASOUND BREAST COMPLETE: CPT

## 2023-07-28 PROCEDURE — 76641 ULTRASOUND BREAST COMPLETE: CPT | Mod: 26,50

## 2023-07-28 PROCEDURE — 77067 SCR MAMMO BI INCL CAD: CPT | Mod: 26

## 2023-07-28 PROCEDURE — 76856 US EXAM PELVIC COMPLETE: CPT | Mod: 26

## 2023-07-28 PROCEDURE — 76830 TRANSVAGINAL US NON-OB: CPT | Mod: 26

## 2023-07-28 PROCEDURE — 77063 BREAST TOMOSYNTHESIS BI: CPT

## 2023-07-28 PROCEDURE — 76830 TRANSVAGINAL US NON-OB: CPT

## 2023-07-28 PROCEDURE — 76856 US EXAM PELVIC COMPLETE: CPT

## 2023-07-31 RX ORDER — PANTOPRAZOLE 40 MG/1
40 TABLET, DELAYED RELEASE ORAL
Qty: 90 | Refills: 0 | Status: ACTIVE | COMMUNITY
Start: 2023-07-31 | End: 1900-01-01

## 2023-07-31 RX ORDER — PANTOPRAZOLE 40 MG/1
40 TABLET, DELAYED RELEASE ORAL
Qty: 30 | Refills: 0 | Status: DISCONTINUED | COMMUNITY
Start: 2023-07-27 | End: 2023-07-31

## 2023-08-03 ENCOUNTER — APPOINTMENT (OUTPATIENT)
Dept: PHYSICAL MEDICINE AND REHAB | Facility: CLINIC | Age: 58
End: 2023-08-03

## 2023-08-06 ENCOUNTER — EMERGENCY (EMERGENCY)
Facility: HOSPITAL | Age: 58
LOS: 1 days | Discharge: HOME | End: 2023-08-06
Attending: STUDENT IN AN ORGANIZED HEALTH CARE EDUCATION/TRAINING PROGRAM | Admitting: STUDENT IN AN ORGANIZED HEALTH CARE EDUCATION/TRAINING PROGRAM
Payer: COMMERCIAL

## 2023-08-06 VITALS
SYSTOLIC BLOOD PRESSURE: 166 MMHG | TEMPERATURE: 97 F | HEART RATE: 110 BPM | DIASTOLIC BLOOD PRESSURE: 111 MMHG | OXYGEN SATURATION: 99 % | HEIGHT: 62.5 IN | RESPIRATION RATE: 24 BRPM | WEIGHT: 134.04 LBS

## 2023-08-06 VITALS
DIASTOLIC BLOOD PRESSURE: 87 MMHG | HEART RATE: 102 BPM | SYSTOLIC BLOOD PRESSURE: 140 MMHG | OXYGEN SATURATION: 97 % | RESPIRATION RATE: 18 BRPM

## 2023-08-06 LAB
ALBUMIN SERPL ELPH-MCNC: 3.8 G/DL — SIGNIFICANT CHANGE UP (ref 3.3–5)
ALP SERPL-CCNC: 139 U/L — HIGH (ref 40–120)
ALT FLD-CCNC: 35 U/L — SIGNIFICANT CHANGE UP (ref 12–78)
ANION GAP SERPL CALC-SCNC: 8 MMOL/L — SIGNIFICANT CHANGE UP (ref 5–17)
APTT BLD: 31.7 SEC — SIGNIFICANT CHANGE UP (ref 24.5–35.6)
AST SERPL-CCNC: 25 U/L — SIGNIFICANT CHANGE UP (ref 15–37)
BASOPHILS # BLD AUTO: 0.04 K/UL — SIGNIFICANT CHANGE UP (ref 0–0.2)
BASOPHILS NFR BLD AUTO: 0.5 % — SIGNIFICANT CHANGE UP (ref 0–2)
BILIRUB SERPL-MCNC: 0.8 MG/DL — SIGNIFICANT CHANGE UP (ref 0.2–1.2)
BUN SERPL-MCNC: 20 MG/DL — SIGNIFICANT CHANGE UP (ref 7–23)
CALCIUM SERPL-MCNC: 9.7 MG/DL — SIGNIFICANT CHANGE UP (ref 8.5–10.1)
CHLORIDE SERPL-SCNC: 102 MMOL/L — SIGNIFICANT CHANGE UP (ref 96–108)
CO2 SERPL-SCNC: 26 MMOL/L — SIGNIFICANT CHANGE UP (ref 22–31)
CREAT SERPL-MCNC: 0.71 MG/DL — SIGNIFICANT CHANGE UP (ref 0.5–1.3)
EGFR: 98 ML/MIN/1.73M2 — SIGNIFICANT CHANGE UP
EOSINOPHIL # BLD AUTO: 0.08 K/UL — SIGNIFICANT CHANGE UP (ref 0–0.5)
EOSINOPHIL NFR BLD AUTO: 0.9 % — SIGNIFICANT CHANGE UP (ref 0–6)
GLUCOSE SERPL-MCNC: 92 MG/DL — SIGNIFICANT CHANGE UP (ref 70–99)
HCT VFR BLD CALC: 36.6 % — SIGNIFICANT CHANGE UP (ref 34.5–45)
HGB BLD-MCNC: 12.8 G/DL — SIGNIFICANT CHANGE UP (ref 11.5–15.5)
IMM GRANULOCYTES NFR BLD AUTO: 0.4 % — SIGNIFICANT CHANGE UP (ref 0–0.9)
INR BLD: 0.96 RATIO — SIGNIFICANT CHANGE UP (ref 0.85–1.18)
LIDOCAIN IGE QN: 92 U/L — SIGNIFICANT CHANGE UP (ref 73–393)
LYMPHOCYTES # BLD AUTO: 1.61 K/UL — SIGNIFICANT CHANGE UP (ref 1–3.3)
LYMPHOCYTES # BLD AUTO: 19.1 % — SIGNIFICANT CHANGE UP (ref 13–44)
MAGNESIUM SERPL-MCNC: 2 MG/DL — SIGNIFICANT CHANGE UP (ref 1.6–2.6)
MCHC RBC-ENTMCNC: 29 PG — SIGNIFICANT CHANGE UP (ref 27–34)
MCHC RBC-ENTMCNC: 35 GM/DL — SIGNIFICANT CHANGE UP (ref 32–36)
MCV RBC AUTO: 82.8 FL — SIGNIFICANT CHANGE UP (ref 80–100)
MONOCYTES # BLD AUTO: 0.62 K/UL — SIGNIFICANT CHANGE UP (ref 0–0.9)
MONOCYTES NFR BLD AUTO: 7.3 % — SIGNIFICANT CHANGE UP (ref 2–14)
NEUTROPHILS # BLD AUTO: 6.07 K/UL — SIGNIFICANT CHANGE UP (ref 1.8–7.4)
NEUTROPHILS NFR BLD AUTO: 71.8 % — SIGNIFICANT CHANGE UP (ref 43–77)
NRBC # BLD: 0 /100 WBCS — SIGNIFICANT CHANGE UP (ref 0–0)
NT-PROBNP SERPL-SCNC: 47 PG/ML — SIGNIFICANT CHANGE UP (ref 0–125)
PLATELET # BLD AUTO: 275 K/UL — SIGNIFICANT CHANGE UP (ref 150–400)
POTASSIUM SERPL-MCNC: 2.8 MMOL/L — CRITICAL LOW (ref 3.5–5.3)
POTASSIUM SERPL-SCNC: 2.8 MMOL/L — CRITICAL LOW (ref 3.5–5.3)
PROT SERPL-MCNC: 7.5 G/DL — SIGNIFICANT CHANGE UP (ref 6–8.3)
PROTHROM AB SERPL-ACNC: 11.2 SEC — SIGNIFICANT CHANGE UP (ref 9.5–13)
RBC # BLD: 4.42 M/UL — SIGNIFICANT CHANGE UP (ref 3.8–5.2)
RBC # FLD: 12.7 % — SIGNIFICANT CHANGE UP (ref 10.3–14.5)
SODIUM SERPL-SCNC: 136 MMOL/L — SIGNIFICANT CHANGE UP (ref 135–145)
TROPONIN I, HIGH SENSITIVITY RESULT: 3.9 NG/L — SIGNIFICANT CHANGE UP
TSH SERPL-MCNC: 1.7 UIU/ML — SIGNIFICANT CHANGE UP (ref 0.36–3.74)
WBC # BLD: 8.45 K/UL — SIGNIFICANT CHANGE UP (ref 3.8–10.5)
WBC # FLD AUTO: 8.45 K/UL — SIGNIFICANT CHANGE UP (ref 3.8–10.5)

## 2023-08-06 PROCEDURE — 93010 ELECTROCARDIOGRAM REPORT: CPT

## 2023-08-06 PROCEDURE — 84484 ASSAY OF TROPONIN QUANT: CPT

## 2023-08-06 PROCEDURE — 71275 CT ANGIOGRAPHY CHEST: CPT | Mod: 26,MA

## 2023-08-06 PROCEDURE — 83735 ASSAY OF MAGNESIUM: CPT

## 2023-08-06 PROCEDURE — 99285 EMERGENCY DEPT VISIT HI MDM: CPT | Mod: 25

## 2023-08-06 PROCEDURE — 83690 ASSAY OF LIPASE: CPT

## 2023-08-06 PROCEDURE — 36415 COLL VENOUS BLD VENIPUNCTURE: CPT

## 2023-08-06 PROCEDURE — 85025 COMPLETE CBC W/AUTO DIFF WBC: CPT

## 2023-08-06 PROCEDURE — 74174 CTA ABD&PLVS W/CONTRAST: CPT | Mod: 26,MA

## 2023-08-06 PROCEDURE — 74174 CTA ABD&PLVS W/CONTRAST: CPT | Mod: MA

## 2023-08-06 PROCEDURE — 84443 ASSAY THYROID STIM HORMONE: CPT

## 2023-08-06 PROCEDURE — 96376 TX/PRO/DX INJ SAME DRUG ADON: CPT | Mod: XU

## 2023-08-06 PROCEDURE — 96374 THER/PROPH/DIAG INJ IV PUSH: CPT | Mod: XU

## 2023-08-06 PROCEDURE — 83880 ASSAY OF NATRIURETIC PEPTIDE: CPT

## 2023-08-06 PROCEDURE — 96375 TX/PRO/DX INJ NEW DRUG ADDON: CPT | Mod: XU

## 2023-08-06 PROCEDURE — 93005 ELECTROCARDIOGRAM TRACING: CPT

## 2023-08-06 PROCEDURE — 99285 EMERGENCY DEPT VISIT HI MDM: CPT

## 2023-08-06 PROCEDURE — 85730 THROMBOPLASTIN TIME PARTIAL: CPT

## 2023-08-06 PROCEDURE — 85610 PROTHROMBIN TIME: CPT

## 2023-08-06 PROCEDURE — 80053 COMPREHEN METABOLIC PANEL: CPT

## 2023-08-06 PROCEDURE — 71275 CT ANGIOGRAPHY CHEST: CPT | Mod: MA

## 2023-08-06 RX ORDER — POTASSIUM CHLORIDE 20 MEQ
40 PACKET (EA) ORAL ONCE
Refills: 0 | Status: COMPLETED | OUTPATIENT
Start: 2023-08-06 | End: 2023-08-06

## 2023-08-06 RX ORDER — ONDANSETRON 8 MG/1
4 TABLET, FILM COATED ORAL ONCE
Refills: 0 | Status: COMPLETED | OUTPATIENT
Start: 2023-08-06 | End: 2023-08-06

## 2023-08-06 RX ORDER — MORPHINE SULFATE 50 MG/1
4 CAPSULE, EXTENDED RELEASE ORAL ONCE
Refills: 0 | Status: DISCONTINUED | OUTPATIENT
Start: 2023-08-06 | End: 2023-08-06

## 2023-08-06 RX ORDER — POTASSIUM CHLORIDE 20 MEQ
10 PACKET (EA) ORAL ONCE
Refills: 0 | Status: COMPLETED | OUTPATIENT
Start: 2023-08-06 | End: 2023-08-06

## 2023-08-06 RX ORDER — DIAZEPAM 5 MG
2 TABLET ORAL ONCE
Refills: 0 | Status: DISCONTINUED | OUTPATIENT
Start: 2023-08-06 | End: 2023-08-06

## 2023-08-06 RX ORDER — POTASSIUM CHLORIDE 20 MEQ
40 PACKET (EA) ORAL ONCE
Refills: 0 | Status: DISCONTINUED | OUTPATIENT
Start: 2023-08-06 | End: 2023-08-06

## 2023-08-06 RX ORDER — KETOROLAC TROMETHAMINE 30 MG/ML
30 SYRINGE (ML) INJECTION ONCE
Refills: 0 | Status: DISCONTINUED | OUTPATIENT
Start: 2023-08-06 | End: 2023-08-06

## 2023-08-06 RX ORDER — SODIUM CHLORIDE 9 MG/ML
500 INJECTION INTRAMUSCULAR; INTRAVENOUS; SUBCUTANEOUS ONCE
Refills: 0 | Status: COMPLETED | OUTPATIENT
Start: 2023-08-06 | End: 2023-08-06

## 2023-08-06 RX ORDER — IBUPROFEN 200 MG
1 TABLET ORAL
Qty: 21 | Refills: 0
Start: 2023-08-06 | End: 2023-08-12

## 2023-08-06 RX ORDER — DIAZEPAM 5 MG
1 TABLET ORAL
Qty: 12 | Refills: 0
Start: 2023-08-06 | End: 2023-08-09

## 2023-08-06 RX ADMIN — Medication 40 MILLIEQUIVALENT(S): at 23:25

## 2023-08-06 RX ADMIN — MORPHINE SULFATE 4 MILLIGRAM(S): 50 CAPSULE, EXTENDED RELEASE ORAL at 20:46

## 2023-08-06 RX ADMIN — Medication 30 MILLIGRAM(S): at 22:34

## 2023-08-06 RX ADMIN — ONDANSETRON 4 MILLIGRAM(S): 8 TABLET, FILM COATED ORAL at 18:35

## 2023-08-06 RX ADMIN — Medication 40 MILLIEQUIVALENT(S): at 19:47

## 2023-08-06 RX ADMIN — MORPHINE SULFATE 4 MILLIGRAM(S): 50 CAPSULE, EXTENDED RELEASE ORAL at 18:49

## 2023-08-06 RX ADMIN — Medication 2 MILLIGRAM(S): at 22:33

## 2023-08-06 RX ADMIN — MORPHINE SULFATE 4 MILLIGRAM(S): 50 CAPSULE, EXTENDED RELEASE ORAL at 18:35

## 2023-08-06 RX ADMIN — Medication 100 MILLIEQUIVALENT(S): at 19:47

## 2023-08-06 RX ADMIN — SODIUM CHLORIDE 500 MILLILITER(S): 9 INJECTION INTRAMUSCULAR; INTRAVENOUS; SUBCUTANEOUS at 19:10

## 2023-08-06 NOTE — ED ADULT TRIAGE NOTE - PATIENT'S PREFERRED PRONOUN
Discharge instructions given to patient and patient's daughter by nurse. Pt has been given counseling on medication use and verbalizes understanding. Pt ambulated off of unit in no signs of distress. Her/She

## 2023-08-06 NOTE — ED PROVIDER NOTE - OBJECTIVE STATEMENT
Patient is a 58-year-old female with past medical history of hypertension hyperlipidemia coming in for worsening mid back pain started around 1 PM today.  Patient states she has chronic back pain and no thoracic spine herniation.  Patient states she is having pain for 1 week started Celebrex and going to physical therapy.  While in the car today started to develop worsening pain at times radiates to the left ribs.  Patient denies any chest pain shortness of breath diaphoresis numbness tingling weakness bowel or bladder incontinence saddle anesthesia.

## 2023-08-06 NOTE — ED PROVIDER NOTE - PATIENT PORTAL LINK FT
You can access the FollowMyHealth Patient Portal offered by [facility] by registering at the following website: [email or web address]. By joining [employer]’s FollowMyHealth portal, you will also be able to view your health information using other applications (apps) compatible with our system.

## 2023-08-06 NOTE — ED ADULT NURSE REASSESSMENT NOTE - NS ED NURSE REASSESS COMMENT FT1
pt returned from ct scan in NAD. able to walk to bathroom with steady gait, returned to stretcher safely. pt reporting increased pain 8/10 at this time, requesting pain meds, pt medicated as per orders. iv meds infusing well without complications, site WNL.  at bedside.
pt received from previous RN. pt able to walk with steady gait to bathroom, family at side for standby assistance. pt safely returned back to stretcher in NAD, RR even and unlabored. pt reporting pain returning, current level 3/10 at this time, pt request to hold off on pain meds at this time. pt tolerated po med well, iv med infusing without complications, site WNL. family at bedside, safety measures maintained, side rails up x2. awaiting ct scan.
Applied

## 2023-08-06 NOTE — ED PROVIDER NOTE - NSFOLLOWUPINSTRUCTIONS_ED_ALL_ED_FT
Follow up with spine  return to er for any worsening symptoms     Acute Back Pain, Adult  Acute back pain is sudden and usually short-lived. It is often caused by an injury to the muscles and tissues in the back. The injury may result from:  A muscle, tendon, or ligament getting overstretched or torn. Ligaments are tissues that connect bones to each other. Lifting something improperly can cause a back strain.  Wear and tear (degeneration) of the spinal disks. Spinal disks are circular tissue that provide cushioning between the bones of the spine (vertebrae).  Twisting motions, such as while playing sports or doing yard work.  A hit to the back.  Arthritis.  You may have a physical exam, lab tests, and imaging tests to find the cause of your pain. Acute back pain usually goes away with rest and home care.    Follow these instructions at home:  Managing pain, stiffness, and swelling    Take over-the-counter and prescription medicines only as told by your health care provider. Treatment may include medicines for pain and inflammation that are taken by mouth or applied to the skin, or muscle relaxants.  Your health care provider may recommend applying ice during the first 24–48 hours after your pain starts. To do this:  Put ice in a plastic bag.  Place a towel between your skin and the bag.  Leave the ice on for 20 minutes, 2–3 times a day.  Remove the ice if your skin turns bright red. This is very important. If you cannot feel pain, heat, or cold, you have a greater risk of damage to the area.  If directed, apply heat to the affected area as often as told by your health care provider. Use the heat source that your health care provider recommends, such as a moist heat pack or a heating pad.  Place a towel between your skin and the heat source.  Leave the heat on for 20–30 minutes.  Remove the heat if your skin turns bright red. This is especially important if you are unable to feel pain, heat, or cold. You have a greater risk of getting burned.  Activity    Comparisons of good and bad posture while driving, standing, sitting at a desk, and lifting heavy objects.  Do not stay in bed. Staying in bed for more than 1–2 days can delay your recovery.  Sit up and stand up straight. Avoid leaning forward when you sit or hunching over when you stand.  If you work at a desk, sit close to it so you do not need to lean over. Keep your chin tucked in. Keep your neck drawn back, and keep your elbows bent at a 90-degree angle (right angle).  Sit high and close to the steering wheel when you drive. Add lower back (lumbar) support to your car seat, if needed.  Take short walks on even surfaces as soon as you are able. Try to increase the length of time you walk each day.  Do not sit, drive, or  one place for more than 30 minutes at a time. Sitting or standing for long periods of time can put stress on your back.  Do not drive or use heavy machinery while taking prescription pain medicine.  Use proper lifting techniques. When you bend and lift, use positions that put less stress on your back:  Bend your knees.  Keep the load close to your body.  Avoid twisting.  Exercise regularly as told by your health care provider. Exercising helps your back heal faster and helps prevent back injuries by keeping muscles strong and flexible.  Work with a physical therapist to make a safe exercise program, as recommended by your health care provider. Do any exercises as told by your physical therapist.  Lifestyle    Maintain a healthy weight. Extra weight puts stress on your back and makes it difficult to have good posture.  Avoid activities or situations that make you feel anxious or stressed. Stress and anxiety increase muscle tension and can make back pain worse. Learn ways to manage anxiety and stress, such as through exercise.  General instructions    Sleep on a firm mattress in a comfortable position. Try lying on your side with your knees slightly bent. If you lie on your back, put a pillow under your knees.  Keep your head and neck in a straight line with your spine (neutral position) when using electronic equipment like smartphones or pads. To do this:  Raise your smartphone or pad to look at it instead of bending your head or neck to look down.  Put the smartphone or pad at the level of your face while looking at the screen.  Follow your treatment plan as told by your health care provider. This may include:  Cognitive or behavioral therapy.  Acupuncture or massage therapy.  Meditation or yoga.  Contact a health care provider if:  You have pain that is not relieved with rest or medicine.  You have increasing pain going down into your legs or buttocks.  Your pain does not improve after 2 weeks.  You have pain at night.  You lose weight without trying.  You have a fever or chills.  You develop nausea or vomiting.  You develop abdominal pain.  Get help right away if:  You develop new bowel or bladder control problems.  You have unusual weakness or numbness in your arms or legs.  You feel faint.  These symptoms may represent a serious problem that is an emergency. Do not wait to see if the symptoms will go away. Get medical help right away. Call your local emergency services (911 in the [place] Do not drive yourself to the hospital.    Summary  Acute back pain is sudden and usually short-lived.  Use proper lifting techniques. When you bend and lift, use positions that put less stress on your back.  Take over-the-counter and prescription medicines only as told by your health care provider, and apply heat or ice as told.  This information is not intended to replace advice given to you by your health care provider. Make sure you discuss any questions you have with your health care provider.

## 2023-08-06 NOTE — ED ADULT NURSE NOTE - NSFALLCONCLUSION_ED_ALL_ED
From: Saranya Ospian  To: Milana Parker  Sent: 1/27/2022 2:21 PM CST  Subject: Covid    I had covid symptoms and positive test at [place] on [address] Jan. 19. Before my appointment on Feb. 1 I wanted to make sure you were aware and that you didn't need me to reschedule.    Universal Safety Interventions

## 2023-08-06 NOTE — ED ADULT NURSE NOTE - OBJECTIVE STATEMENT
Patient is 59yo F presents with c/o mid upper back pain, chronic, with sudden onset increase in intensity of pain today around 1pm. Patient reports chronic issues with her thoracic spine with bulging discs, reports she recently started celebrex for back spasms, but today the pain is acutely worse. Patient denies chest pain or SOB, reports she has pain radiating to the posterior left thigh but that is normal for her with her normal back pain. Patient endorses h/o HTN.

## 2023-08-06 NOTE — ED PROVIDER NOTE - ATTENDING APP SHARED VISIT CONTRIBUTION OF CARE
Christofer Beck MD (Attending Physician):    I performed a history and physical exam of the patient and discussed their management with the BOGDAN. I have reviewed the BOGDAN note and agree with the documented findings and plan of care, except as noted. This was a shared visit with an BOGDAN. I reviewed and verified the documentation and independently performed my own history/exam/medical decision making. My medical decision making and observations are found below. Please refer to any progress notes for updates on clinical course.    HPI:  Patient is a 58-year-old female with past medical history of hypertension hyperlipidemia coming in for worsening mid back pain started around 1 PM today.  Patient states she has chronic back pain and no thoracic spine herniation.  Patient states she is having pain for 1 week started Celebrex and going to physical therapy.  While in the car today started to develop worsening pain at times radiates to the left ribs.  Patient denies any chest pain shortness of breath diaphoresis numbness tingling weakness bowel or bladder incontinence saddle anesthesia.    PE:  GEN - In mild distress, A&Ox3  HEAD - NC/AT  EYES - PERRL, EOMI  ENT - Airway patent, mucous membranes moist  NECK - Supple, non-tender without lymphadenopathy, no masses  PULMONARY - CTA b/l, symmetric breath sounds, no W/R/R  CARDIAC - +S1S2, RRR, no M/G/R, no JVD  ABDOMEN - +BS, ND, NT, soft, no guarding, no rebound, no masses, no rigidity   - No CVA TTP, no suprapubic TTP  BACK - Midline TTP over thoracic spine with associated paraspinal TTP.  EXTREMITIES - FROM, symmetric pulses, capillary refill <2 seconds, no edema, 5/5 strength in b/l UE and LE  SKIN - No rash or bruising  NEUROLOGIC - Alert, speech clear, no focal deficits, CN II-XII grossly intact, unable to assess gait at this time, strength and sensation grossly intact, FTN negative b/l  PSYCH - Normal mood/affect, normal insight    MDM:  DDx includes, but not limited to: aortic dissection, AAA rupture, spinal fracture, worsening chronic back pain, kidney stone, pancreatitis. ekg, CTA chest/abd/pelvis, labs, pain control, IVF. Dispo pending w/u.

## 2023-08-06 NOTE — ED PROVIDER NOTE - CARE PROVIDER_API CALL
Alena Bradshaw  Orthopaedic Surgery  [address] [facility], [place] [address]  Phone: [phone]  Fax: [phone]  Follow Up Time:

## 2023-08-31 ENCOUNTER — APPOINTMENT (OUTPATIENT)
Dept: DERMATOLOGY | Facility: CLINIC | Age: 58
End: 2023-08-31
Payer: COMMERCIAL

## 2023-08-31 DIAGNOSIS — D22.9 MELANOCYTIC NEVI, UNSPECIFIED: ICD-10-CM

## 2023-08-31 DIAGNOSIS — Z12.83 ENCOUNTER FOR SCREENING FOR MALIGNANT NEOPLASM OF SKIN: ICD-10-CM

## 2023-08-31 DIAGNOSIS — L70.0 ACNE VULGARIS: ICD-10-CM

## 2023-08-31 DIAGNOSIS — L81.4 OTHER MELANIN HYPERPIGMENTATION: ICD-10-CM

## 2023-08-31 PROCEDURE — 99213 OFFICE O/P EST LOW 20 MIN: CPT

## 2023-08-31 NOTE — HISTORY OF PRESENT ILLNESS
[FreeTextEntry1] : bumps on the face [de-identified] : 58F with no personal hx of skin cancer here for bumps on the face x years. Come and go. Otherwise, no new, evolving, or symptomatic skin lesions.

## 2023-08-31 NOTE — PHYSICAL EXAM
[Alert] : alert [Oriented x 3] : ~L oriented x 3 [Well Nourished] : well nourished [Conjunctiva Non-injected] : conjunctiva non-injected [No Visual Lymphadenopathy] : no visual  lymphadenopathy [No Clubbing] : no clubbing [No Edema] : no edema [No Bromhidrosis] : no bromhidrosis [No Chromhidrosis] : no chromhidrosis [FreeTextEntry3] : The patient is well-appearing, in no acute distress, alert and oriented x 3. Mood and affect are normal. A complete cutaneous examination of the scalp, face, neck, chest, abdomen, back, bilateral arms, bilateral legs, buttocks, digits, nails, eyelids, conjunctiva and lips reveals the following significant findings: -Tan reticulate macules in a photodistribution  -Tan to dark brown macules on the trunk and extremities with no concerning dermoscopic features  -Closed comedones on the face

## 2023-10-14 ENCOUNTER — NON-APPOINTMENT (OUTPATIENT)
Age: 58
End: 2023-10-14

## 2023-12-08 ENCOUNTER — NON-APPOINTMENT (OUTPATIENT)
Age: 58
End: 2023-12-08

## 2024-05-29 DIAGNOSIS — Z87.42 PERSONAL HISTORY OF OTHER DISEASES OF THE FEMALE GENITAL TRACT: ICD-10-CM

## 2024-05-29 DIAGNOSIS — Z12.31 ENCOUNTER FOR SCREENING MAMMOGRAM FOR MALIGNANT NEOPLASM OF BREAST: ICD-10-CM

## 2024-05-29 DIAGNOSIS — N63.0 UNSPECIFIED LUMP IN UNSPECIFIED BREAST: ICD-10-CM

## 2024-06-27 NOTE — ED ADULT NURSE NOTE - HOW OFTEN DO YOU HAVE A DRINK CONTAINING ALCOHOL?

## 2024-06-28 ENCOUNTER — APPOINTMENT (OUTPATIENT)
Dept: OBGYN | Facility: CLINIC | Age: 59
End: 2024-06-28
Payer: COMMERCIAL

## 2024-06-28 VITALS
BODY MASS INDEX: 24.4 KG/M2 | WEIGHT: 136 LBS | SYSTOLIC BLOOD PRESSURE: 126 MMHG | DIASTOLIC BLOOD PRESSURE: 76 MMHG | HEIGHT: 62.5 IN

## 2024-06-28 DIAGNOSIS — Z01.419 ENCOUNTER FOR GYNECOLOGICAL EXAMINATION (GENERAL) (ROUTINE) W/OUT ABNORMAL FINDINGS: ICD-10-CM

## 2024-06-28 PROCEDURE — 99396 PREV VISIT EST AGE 40-64: CPT

## 2024-07-01 LAB — HPV HIGH+LOW RISK DNA PNL CVX: NOT DETECTED

## 2024-07-02 LAB — CYTOLOGY CVX/VAG DOC THIN PREP: ABNORMAL

## 2024-08-02 ENCOUNTER — APPOINTMENT (OUTPATIENT)
Dept: MAMMOGRAPHY | Facility: CLINIC | Age: 59
End: 2024-08-02
Payer: COMMERCIAL

## 2024-08-02 ENCOUNTER — APPOINTMENT (OUTPATIENT)
Dept: ULTRASOUND IMAGING | Facility: CLINIC | Age: 59
End: 2024-08-02
Payer: COMMERCIAL

## 2024-08-02 ENCOUNTER — OUTPATIENT (OUTPATIENT)
Dept: OUTPATIENT SERVICES | Facility: HOSPITAL | Age: 59
LOS: 1 days | End: 2024-08-02
Payer: COMMERCIAL

## 2024-08-02 ENCOUNTER — RESULT REVIEW (OUTPATIENT)
Age: 59
End: 2024-08-02

## 2024-08-02 DIAGNOSIS — N63.0 UNSPECIFIED LUMP IN UNSPECIFIED BREAST: ICD-10-CM

## 2024-08-02 DIAGNOSIS — Z87.42 PERSONAL HISTORY OF OTHER DISEASES OF THE FEMALE GENITAL TRACT: ICD-10-CM

## 2024-08-02 DIAGNOSIS — Z00.8 ENCOUNTER FOR OTHER GENERAL EXAMINATION: ICD-10-CM

## 2024-08-02 DIAGNOSIS — Z12.31 ENCOUNTER FOR SCREENING MAMMOGRAM FOR MALIGNANT NEOPLASM OF BREAST: ICD-10-CM

## 2024-08-02 PROCEDURE — 77063 BREAST TOMOSYNTHESIS BI: CPT | Mod: 26

## 2024-08-02 PROCEDURE — 76856 US EXAM PELVIC COMPLETE: CPT

## 2024-08-02 PROCEDURE — 76856 US EXAM PELVIC COMPLETE: CPT | Mod: 26

## 2024-08-02 PROCEDURE — 77067 SCR MAMMO BI INCL CAD: CPT | Mod: 26

## 2024-08-02 PROCEDURE — 77063 BREAST TOMOSYNTHESIS BI: CPT

## 2024-08-02 PROCEDURE — 76641 ULTRASOUND BREAST COMPLETE: CPT

## 2024-08-02 PROCEDURE — 76641 ULTRASOUND BREAST COMPLETE: CPT | Mod: 26,50

## 2024-08-02 PROCEDURE — 76830 TRANSVAGINAL US NON-OB: CPT | Mod: 26

## 2024-08-02 PROCEDURE — 77067 SCR MAMMO BI INCL CAD: CPT

## 2024-08-02 PROCEDURE — 76830 TRANSVAGINAL US NON-OB: CPT

## 2024-08-05 PROBLEM — N63.0 BREAST NODULE: Status: ACTIVE | Noted: 2024-08-05

## 2025-01-24 ENCOUNTER — APPOINTMENT (OUTPATIENT)
Dept: ORTHOPEDIC SURGERY | Facility: CLINIC | Age: 60
End: 2025-01-24

## 2025-01-24 VITALS — WEIGHT: 136 LBS | BODY MASS INDEX: 24.4 KG/M2 | HEIGHT: 62.5 IN

## 2025-01-24 DIAGNOSIS — M18.12 UNILATERAL PRIMARY OSTEOARTHRITIS OF FIRST CARPOMETACARPAL JOINT, LEFT HAND: ICD-10-CM

## 2025-01-24 PROCEDURE — J3490M: CUSTOM | Mod: NC

## 2025-01-24 PROCEDURE — 99213 OFFICE O/P EST LOW 20 MIN: CPT | Mod: 25

## 2025-01-24 PROCEDURE — 20600 DRAIN/INJ JOINT/BURSA W/O US: CPT | Mod: LT

## 2025-01-24 PROCEDURE — 73110 X-RAY EXAM OF WRIST: CPT | Mod: LT

## 2025-02-03 ENCOUNTER — RESULT REVIEW (OUTPATIENT)
Age: 60
End: 2025-02-03

## 2025-02-03 ENCOUNTER — APPOINTMENT (OUTPATIENT)
Dept: ULTRASOUND IMAGING | Facility: CLINIC | Age: 60
End: 2025-02-03
Payer: COMMERCIAL

## 2025-02-03 ENCOUNTER — OUTPATIENT (OUTPATIENT)
Dept: OUTPATIENT SERVICES | Facility: HOSPITAL | Age: 60
LOS: 1 days | End: 2025-02-03
Payer: COMMERCIAL

## 2025-02-03 DIAGNOSIS — N63.0 UNSPECIFIED LUMP IN UNSPECIFIED BREAST: ICD-10-CM

## 2025-02-03 PROCEDURE — 76642 ULTRASOUND BREAST LIMITED: CPT | Mod: 26,RT

## 2025-02-03 PROCEDURE — 76641 ULTRASOUND BREAST COMPLETE: CPT

## 2025-02-03 PROCEDURE — 76642 ULTRASOUND BREAST LIMITED: CPT

## 2025-03-17 ENCOUNTER — APPOINTMENT (OUTPATIENT)
Dept: ORTHOPEDIC SURGERY | Facility: CLINIC | Age: 60
End: 2025-03-17

## 2025-08-11 ENCOUNTER — OUTPATIENT (OUTPATIENT)
Dept: OUTPATIENT SERVICES | Facility: HOSPITAL | Age: 60
LOS: 1 days | End: 2025-08-11
Payer: COMMERCIAL

## 2025-08-11 ENCOUNTER — APPOINTMENT (OUTPATIENT)
Dept: ULTRASOUND IMAGING | Facility: CLINIC | Age: 60
End: 2025-08-11
Payer: COMMERCIAL

## 2025-08-11 ENCOUNTER — RESULT REVIEW (OUTPATIENT)
Age: 60
End: 2025-08-11

## 2025-08-11 ENCOUNTER — APPOINTMENT (OUTPATIENT)
Dept: MAMMOGRAPHY | Facility: CLINIC | Age: 60
End: 2025-08-11
Payer: COMMERCIAL

## 2025-08-11 DIAGNOSIS — Z12.31 ENCOUNTER FOR SCREENING MAMMOGRAM FOR MALIGNANT NEOPLASM OF BREAST: ICD-10-CM

## 2025-08-11 DIAGNOSIS — R92.30 DENSE BREASTS, UNSPECIFIED: ICD-10-CM

## 2025-08-11 DIAGNOSIS — N83.209 UNSPECIFIED OVARIAN CYST, UNSPECIFIED SIDE: ICD-10-CM

## 2025-08-11 PROCEDURE — 77067 SCR MAMMO BI INCL CAD: CPT | Mod: 26

## 2025-08-11 PROCEDURE — 77063 BREAST TOMOSYNTHESIS BI: CPT

## 2025-08-11 PROCEDURE — 77063 BREAST TOMOSYNTHESIS BI: CPT | Mod: 26

## 2025-08-11 PROCEDURE — 76830 TRANSVAGINAL US NON-OB: CPT | Mod: 26

## 2025-08-11 PROCEDURE — 76856 US EXAM PELVIC COMPLETE: CPT

## 2025-08-11 PROCEDURE — 76856 US EXAM PELVIC COMPLETE: CPT | Mod: 26

## 2025-08-11 PROCEDURE — 76641 ULTRASOUND BREAST COMPLETE: CPT

## 2025-08-11 PROCEDURE — 76641 ULTRASOUND BREAST COMPLETE: CPT | Mod: 26,50

## 2025-08-11 PROCEDURE — 77067 SCR MAMMO BI INCL CAD: CPT

## 2025-08-11 PROCEDURE — 76830 TRANSVAGINAL US NON-OB: CPT

## 2025-08-13 ENCOUNTER — TRANSCRIPTION ENCOUNTER (OUTPATIENT)
Age: 60
End: 2025-08-13